# Patient Record
Sex: FEMALE | Race: WHITE
[De-identification: names, ages, dates, MRNs, and addresses within clinical notes are randomized per-mention and may not be internally consistent; named-entity substitution may affect disease eponyms.]

---

## 2018-09-04 ENCOUNTER — HOSPITAL ENCOUNTER (OUTPATIENT)
Age: 58
End: 2018-09-04
Payer: MEDICAID

## 2018-09-04 DIAGNOSIS — M25.511: Primary | ICD-10-CM

## 2018-09-04 PROCEDURE — 73030 X-RAY EXAM OF SHOULDER: CPT

## 2020-03-05 ENCOUNTER — HOSPITAL ENCOUNTER (OUTPATIENT)
Age: 60
End: 2020-03-05
Payer: COMMERCIAL

## 2020-03-05 DIAGNOSIS — Z12.2: ICD-10-CM

## 2020-03-05 DIAGNOSIS — Z87.891: ICD-10-CM

## 2020-03-05 DIAGNOSIS — Z12.31: Primary | ICD-10-CM

## 2020-03-05 PROCEDURE — 77063 BREAST TOMOSYNTHESIS BI: CPT

## 2020-03-05 PROCEDURE — 77067 SCR MAMMO BI INCL CAD: CPT

## 2020-07-09 ENCOUNTER — HOSPITAL ENCOUNTER (OUTPATIENT)
Age: 60
End: 2020-07-09
Payer: COMMERCIAL

## 2020-07-09 DIAGNOSIS — Z01.818: Primary | ICD-10-CM

## 2020-07-09 PROCEDURE — 36415 COLL VENOUS BLD VENIPUNCTURE: CPT

## 2020-07-09 PROCEDURE — 86328 IA NFCT AB SARSCOV2 COVID19: CPT

## 2020-07-10 ENCOUNTER — ON CAMPUS - OUTPATIENT (OUTPATIENT)
Dept: RURAL HOSPITAL 6 | Facility: HOSPITAL | Age: 60
End: 2020-07-10
Payer: COMMERCIAL

## 2020-07-10 ENCOUNTER — HOSPITAL ENCOUNTER (OUTPATIENT)
Dept: HOSPITAL 22 - OUTP | Age: 60
Discharge: HOME | End: 2020-07-10
Payer: COMMERCIAL

## 2020-07-10 VITALS
DIASTOLIC BLOOD PRESSURE: 88 MMHG | SYSTOLIC BLOOD PRESSURE: 136 MMHG | OXYGEN SATURATION: 97 % | HEART RATE: 73 BPM | TEMPERATURE: 97.1 F | RESPIRATION RATE: 18 BRPM

## 2020-07-10 VITALS — OXYGEN SATURATION: 97 %

## 2020-07-10 VITALS
DIASTOLIC BLOOD PRESSURE: 76 MMHG | SYSTOLIC BLOOD PRESSURE: 115 MMHG | TEMPERATURE: 97.1 F | RESPIRATION RATE: 16 BRPM | HEART RATE: 70 BPM | OXYGEN SATURATION: 97 %

## 2020-07-10 VITALS
TEMPERATURE: 97.16 F | DIASTOLIC BLOOD PRESSURE: 69 MMHG | HEART RATE: 69 BPM | SYSTOLIC BLOOD PRESSURE: 91 MMHG | RESPIRATION RATE: 16 BRPM | OXYGEN SATURATION: 98 %

## 2020-07-10 VITALS
TEMPERATURE: 97.16 F | OXYGEN SATURATION: 97 % | DIASTOLIC BLOOD PRESSURE: 72 MMHG | SYSTOLIC BLOOD PRESSURE: 105 MMHG | RESPIRATION RATE: 16 BRPM | HEART RATE: 72 BPM

## 2020-07-10 VITALS
SYSTOLIC BLOOD PRESSURE: 99 MMHG | RESPIRATION RATE: 16 BRPM | TEMPERATURE: 97.1 F | OXYGEN SATURATION: 95 % | HEART RATE: 74 BPM | DIASTOLIC BLOOD PRESSURE: 68 MMHG

## 2020-07-10 VITALS
RESPIRATION RATE: 18 BRPM | HEART RATE: 77 BPM | TEMPERATURE: 98.1 F | SYSTOLIC BLOOD PRESSURE: 135 MMHG | DIASTOLIC BLOOD PRESSURE: 72 MMHG | OXYGEN SATURATION: 96 %

## 2020-07-10 VITALS — BODY MASS INDEX: 34.9 KG/M2

## 2020-07-10 DIAGNOSIS — F41.9: ICD-10-CM

## 2020-07-10 DIAGNOSIS — K63.5: ICD-10-CM

## 2020-07-10 DIAGNOSIS — D12.4 BENIGN NEOPLASM OF DESCENDING COLON: ICD-10-CM

## 2020-07-10 DIAGNOSIS — Z90.710: ICD-10-CM

## 2020-07-10 DIAGNOSIS — D12.3 BENIGN NEOPLASM OF TRANSVERSE COLON: ICD-10-CM

## 2020-07-10 DIAGNOSIS — D12.5 BENIGN NEOPLASM OF SIGMOID COLON: ICD-10-CM

## 2020-07-10 DIAGNOSIS — Z79.891: ICD-10-CM

## 2020-07-10 DIAGNOSIS — Z12.11 ENCOUNTER FOR SCREENING FOR MALIGNANT NEOPLASM OF COLON: ICD-10-CM

## 2020-07-10 DIAGNOSIS — K64.0: ICD-10-CM

## 2020-07-10 DIAGNOSIS — Z72.0: ICD-10-CM

## 2020-07-10 DIAGNOSIS — D12.0 BENIGN NEOPLASM OF CECUM: ICD-10-CM

## 2020-07-10 DIAGNOSIS — Z87.11: ICD-10-CM

## 2020-07-10 DIAGNOSIS — E78.5: ICD-10-CM

## 2020-07-10 DIAGNOSIS — Z12.11: Primary | ICD-10-CM

## 2020-07-10 DIAGNOSIS — F32.9: ICD-10-CM

## 2020-07-10 DIAGNOSIS — Z79.899: ICD-10-CM

## 2020-07-10 DIAGNOSIS — I10: ICD-10-CM

## 2020-07-10 PROCEDURE — 45385 COLONOSCOPY W/LESION REMOVAL: CPT | Mod: 33 | Performed by: INTERNAL MEDICINE

## 2020-07-10 PROCEDURE — 45385 COLONOSCOPY W/LESION REMOVAL: CPT

## 2020-07-10 PROCEDURE — 0DJD8ZZ INSPECTION OF LOWER INTESTINAL TRACT, VIA NATURAL OR ARTIFICIAL OPENING ENDOSCOPIC: ICD-10-PCS | Performed by: INTERNAL MEDICINE

## 2021-05-18 ENCOUNTER — HOSPITAL ENCOUNTER (OUTPATIENT)
Age: 61
End: 2021-05-18
Payer: COMMERCIAL

## 2021-05-18 DIAGNOSIS — Z20.822: Primary | ICD-10-CM

## 2021-05-18 PROCEDURE — 71045 X-RAY EXAM CHEST 1 VIEW: CPT

## 2021-05-18 PROCEDURE — U0003 INFECTIOUS AGENT DETECTION BY NUCLEIC ACID (DNA OR RNA); SEVERE ACUTE RESPIRATORY SYNDROME CORONAVIRUS 2 (SARS-COV-2) (CORONAVIRUS DISEASE [COVID-19]), AMPLIFIED PROBE TECHNIQUE, MAKING USE OF HIGH THROUGHPUT TECHNOLOGIES AS DESCRIBED BY CMS-2020-01-R: HCPCS

## 2021-09-19 ENCOUNTER — HOSPITAL ENCOUNTER (EMERGENCY)
Age: 61
Discharge: HOME | End: 2021-09-19
Payer: COMMERCIAL

## 2021-09-19 VITALS
SYSTOLIC BLOOD PRESSURE: 125 MMHG | OXYGEN SATURATION: 98 % | HEART RATE: 91 BPM | TEMPERATURE: 98.6 F | DIASTOLIC BLOOD PRESSURE: 84 MMHG | RESPIRATION RATE: 19 BRPM

## 2021-09-19 VITALS
OXYGEN SATURATION: 98 % | HEART RATE: 91 BPM | RESPIRATION RATE: 19 BRPM | TEMPERATURE: 98.6 F | SYSTOLIC BLOOD PRESSURE: 125 MMHG | DIASTOLIC BLOOD PRESSURE: 84 MMHG

## 2021-09-19 VITALS — BODY MASS INDEX: 34 KG/M2

## 2021-09-19 DIAGNOSIS — Z20.822: Primary | ICD-10-CM

## 2021-09-19 DIAGNOSIS — E78.5: ICD-10-CM

## 2021-09-19 DIAGNOSIS — J02.9: ICD-10-CM

## 2021-09-19 DIAGNOSIS — I10: ICD-10-CM

## 2021-09-19 DIAGNOSIS — Z79.899: ICD-10-CM

## 2021-09-19 PROCEDURE — U0003 INFECTIOUS AGENT DETECTION BY NUCLEIC ACID (DNA OR RNA); SEVERE ACUTE RESPIRATORY SYNDROME CORONAVIRUS 2 (SARS-COV-2) (CORONAVIRUS DISEASE [COVID-19]), AMPLIFIED PROBE TECHNIQUE, MAKING USE OF HIGH THROUGHPUT TECHNOLOGIES AS DESCRIBED BY CMS-2020-01-R: HCPCS

## 2021-09-19 PROCEDURE — 99202 OFFICE O/P NEW SF 15 MIN: CPT

## 2021-09-19 PROCEDURE — C9803 HOPD COVID-19 SPEC COLLECT: HCPCS

## 2021-09-19 PROCEDURE — U0005 INFEC AGEN DETEC AMPLI PROBE: HCPCS

## 2021-09-19 PROCEDURE — G0463 HOSPITAL OUTPT CLINIC VISIT: HCPCS

## 2022-05-05 ENCOUNTER — OFFICE VISIT (OUTPATIENT)
Dept: FAMILY MEDICINE CLINIC | Facility: CLINIC | Age: 62
End: 2022-05-05

## 2022-05-05 VITALS
SYSTOLIC BLOOD PRESSURE: 118 MMHG | HEART RATE: 62 BPM | OXYGEN SATURATION: 99 % | DIASTOLIC BLOOD PRESSURE: 70 MMHG | WEIGHT: 212.6 LBS | RESPIRATION RATE: 20 BRPM | BODY MASS INDEX: 40.14 KG/M2 | TEMPERATURE: 97.3 F | HEIGHT: 61 IN

## 2022-05-05 DIAGNOSIS — N18.31 STAGE 3A CHRONIC KIDNEY DISEASE (CKD): ICD-10-CM

## 2022-05-05 DIAGNOSIS — M47.816 ARTHRITIS OF LUMBAR SPINE: Chronic | ICD-10-CM

## 2022-05-05 DIAGNOSIS — E78.00 HYPERCHOLESTEROLEMIA: ICD-10-CM

## 2022-05-05 DIAGNOSIS — M48.02 FORAMINAL STENOSIS OF CERVICAL REGION: Chronic | ICD-10-CM

## 2022-05-05 DIAGNOSIS — I10 ESSENTIAL HYPERTENSION: Primary | ICD-10-CM

## 2022-05-05 PROBLEM — F41.1 GENERALIZED ANXIETY DISORDER: Status: ACTIVE | Noted: 2022-05-05

## 2022-05-05 PROBLEM — G89.4 CHRONIC PAIN SYNDROME: Status: ACTIVE | Noted: 2022-05-05

## 2022-05-05 LAB
BUN SERPL-MCNC: 9 MG/DL (ref 8–23)
BUN/CREAT SERPL: 7.6 (ref 7–25)
CALCIUM SERPL-MCNC: 10.4 MG/DL (ref 8.6–10.5)
CHLORIDE SERPL-SCNC: 104 MMOL/L (ref 98–107)
CHOLEST SERPL-MCNC: 176 MG/DL (ref 0–200)
CO2 SERPL-SCNC: 30.4 MMOL/L (ref 22–29)
CREAT SERPL-MCNC: 1.18 MG/DL (ref 0.57–1)
EGFRCR SERPLBLD CKD-EPI 2021: 52.7 ML/MIN/1.73
GLUCOSE SERPL-MCNC: 97 MG/DL (ref 65–99)
HDLC SERPL-MCNC: 38 MG/DL (ref 40–60)
LDLC SERPL CALC-MCNC: 96 MG/DL (ref 0–100)
POTASSIUM SERPL-SCNC: 4.4 MMOL/L (ref 3.5–5.2)
SODIUM SERPL-SCNC: 144 MMOL/L (ref 136–145)
TRIGL SERPL-MCNC: 247 MG/DL (ref 0–150)
VLDLC SERPL CALC-MCNC: 42 MG/DL (ref 5–40)

## 2022-05-05 PROCEDURE — 99214 OFFICE O/P EST MOD 30 MIN: CPT | Performed by: FAMILY MEDICINE

## 2022-05-05 RX ORDER — HYDROCHLOROTHIAZIDE 12.5 MG/1
12.5 CAPSULE, GELATIN COATED ORAL EVERY MORNING
COMMUNITY
Start: 2022-04-18 | End: 2022-08-05 | Stop reason: SDUPTHER

## 2022-05-05 RX ORDER — HYDROCODONE BITARTRATE AND ACETAMINOPHEN 7.5; 325 MG/1; MG/1
1 TABLET ORAL EVERY 8 HOURS PRN
Qty: 90 TABLET | Refills: 0 | Status: SHIPPED | OUTPATIENT
Start: 2022-05-05 | End: 2022-06-06 | Stop reason: SDUPTHER

## 2022-05-05 RX ORDER — BISOPROLOL FUMARATE AND HYDROCHLOROTHIAZIDE 10; 6.25 MG/1; MG/1
1 TABLET ORAL DAILY
COMMUNITY
Start: 2022-04-15 | End: 2022-08-05 | Stop reason: SDUPTHER

## 2022-05-05 RX ORDER — FAMOTIDINE 20 MG/1
20 TABLET, FILM COATED ORAL NIGHTLY PRN
COMMUNITY
Start: 2022-04-27 | End: 2023-02-09 | Stop reason: SDUPTHER

## 2022-05-05 RX ORDER — DAPAGLIFLOZIN 10 MG/1
1 TABLET, FILM COATED ORAL DAILY
COMMUNITY
Start: 2022-04-27 | End: 2022-08-05 | Stop reason: SDUPTHER

## 2022-05-05 RX ORDER — CYCLOBENZAPRINE HCL 5 MG
TABLET ORAL
COMMUNITY
Start: 2022-03-31 | End: 2023-02-09 | Stop reason: SDUPTHER

## 2022-05-05 RX ORDER — LOVASTATIN 10 MG/1
TABLET ORAL
COMMUNITY
Start: 2022-04-18 | End: 2022-08-05 | Stop reason: SDUPTHER

## 2022-05-05 RX ORDER — VENLAFAXINE HYDROCHLORIDE 75 MG/1
75 CAPSULE, EXTENDED RELEASE ORAL DAILY
COMMUNITY
Start: 2022-03-31 | End: 2023-02-09 | Stop reason: SDUPTHER

## 2022-05-05 RX ORDER — DIAZEPAM 10 MG/1
10 TABLET ORAL 3 TIMES DAILY PRN
COMMUNITY
Start: 2022-04-30 | End: 2022-09-27 | Stop reason: SDUPTHER

## 2022-05-05 RX ORDER — HYDROCODONE BITARTRATE AND ACETAMINOPHEN 7.5; 325 MG/1; MG/1
1 TABLET ORAL EVERY 8 HOURS PRN
COMMUNITY
End: 2022-05-05 | Stop reason: SDUPTHER

## 2022-05-05 NOTE — PROGRESS NOTES
"Chief Complaint   Patient presents with   • Establish Care     Previous Liseth pt    • Hypertension   • Chronic Kidney Disease       Subjective      Sherri Elkins is a 62 y.o. who presents for visit for HTN and CKD. At her last visit Farxiga was added for CKD. She has additional complaint of burning in the left lower neck with prolonged head flexion as well as new left sided low back pain that radiates to the hip. She has known Lumbar arthritis.    Objective   Vital Signs:  /70   Pulse 62   Temp 97.3 °F (36.3 °C)   Resp 20   Ht 154.9 cm (61\")   Wt 96.4 kg (212 lb 9.6 oz)   SpO2 99%   BMI 40.17 kg/m²     Class 3 Severe Obesity (BMI >=40). Obesity-related health conditions include the following: hypertension, dyslipidemias, osteoarthritis and CKD. Obesity is improving with lifestyle modifications. BMI is is above average; BMI management plan is completed. We discussed low calorie, low carb based diet program and portion control.        Physical Exam  Constitutional:       Appearance: She is obese.   Neck:      Comments: Decreased ROM to right  Cardiovascular:      Rate and Rhythm: Normal rate and regular rhythm.   Pulmonary:      Effort: Pulmonary effort is normal.      Breath sounds: Normal breath sounds.   Musculoskeletal:      Cervical back: Rigidity present.   Neurological:      Mental Status: She is alert.          Result Review                     Assessment and Plan  Diagnoses and all orders for this visit:    1. Essential hypertension (Primary)  Assessment & Plan:  Hypertension is improving with treatment.  Continue current treatment regimen.  Dietary sodium restriction.  Continue current medications.  Blood pressure will be reassessed at the next regular appointment.    Orders:  -     Basic Metabolic Panel  -     Lipid Panel    2. Hypercholesterolemia  Assessment & Plan:  Lipid abnormalities are improving with treatment.  Pharmacotherapy as ordered.  Lipids will be reassessed in 1 " year.    Orders:  -     Lipid Panel    3. Stage 3a chronic kidney disease (CKD) ([facility])  Assessment & Plan:  Renal condition is newly identified.  Continue current treatment regimen.  Continue current medications.  repeat BMP  Renal condition will be reassessed in 6 months.    Orders:  -     Basic Metabolic Panel    4. Arthritis of lumbar spine  Comments:  1. Repeat xrays  2. Refill Norco  3. Caleb Reviewed  Orders:  -     XR Spine Lumbar Complete With Flex & Ext; Future  -     HYDROcodone-acetaminophen (NORCO) 7.5-325 MG per tablet; Take 1 tablet by mouth Every 8 (Eight) Hours As Needed for Moderate Pain .  Dispense: 90 tablet; Refill: 0    5. Foraminal stenosis of cervical region  Comments:  ROM exercises          Follow Up  Return in about 3 months (around 8/5/2022) for Next scheduled follow up HTN.  Patient was given instructions and counseling regarding her condition or for health maintenance advice. Please see specific information pulled into the AVS if appropriate.

## 2022-05-05 NOTE — ASSESSMENT & PLAN NOTE
Renal condition is newly identified.  Continue current treatment regimen.  Continue current medications.  repeat BMP  Renal condition will be reassessed in 6 months.

## 2022-06-06 ENCOUNTER — PATIENT MESSAGE (OUTPATIENT)
Dept: FAMILY MEDICINE CLINIC | Facility: CLINIC | Age: 62
End: 2022-06-06

## 2022-06-06 DIAGNOSIS — M47.816 ARTHRITIS OF LUMBAR SPINE: Chronic | ICD-10-CM

## 2022-06-06 RX ORDER — HYDROCODONE BITARTRATE AND ACETAMINOPHEN 7.5; 325 MG/1; MG/1
1 TABLET ORAL EVERY 8 HOURS PRN
Qty: 90 TABLET | Refills: 0 | Status: SHIPPED | OUTPATIENT
Start: 2022-06-06 | End: 2022-07-04 | Stop reason: SDUPTHER

## 2022-06-06 NOTE — TELEPHONE ENCOUNTER
From: Sherri Elkins  To: Malik Reddy MD  Sent: 2022 2:25 PM EDT  Subject: refill on my hydroco/apap 7.5-325 rx #[id number]    call to [facility] pharmacy [phone] thank you Sherri Elkins  : 1960 [place], [place]

## 2022-07-04 DIAGNOSIS — M47.816 ARTHRITIS OF LUMBAR SPINE: Chronic | ICD-10-CM

## 2022-07-06 RX ORDER — HYDROCODONE BITARTRATE AND ACETAMINOPHEN 7.5; 325 MG/1; MG/1
1 TABLET ORAL EVERY 8 HOURS PRN
Qty: 90 TABLET | Refills: 0 | Status: SHIPPED | OUTPATIENT
Start: 2022-07-06 | End: 2022-08-05 | Stop reason: SDUPTHER

## 2022-08-05 ENCOUNTER — OFFICE VISIT (OUTPATIENT)
Dept: FAMILY MEDICINE CLINIC | Facility: CLINIC | Age: 62
End: 2022-08-05

## 2022-08-05 VITALS
HEIGHT: 61 IN | WEIGHT: 203.8 LBS | SYSTOLIC BLOOD PRESSURE: 104 MMHG | HEART RATE: 62 BPM | DIASTOLIC BLOOD PRESSURE: 72 MMHG | RESPIRATION RATE: 18 BRPM | TEMPERATURE: 97.1 F | OXYGEN SATURATION: 96 % | BODY MASS INDEX: 38.48 KG/M2

## 2022-08-05 DIAGNOSIS — M47.816 ARTHRITIS OF LUMBAR SPINE: Chronic | ICD-10-CM

## 2022-08-05 DIAGNOSIS — N18.31 STAGE 3A CHRONIC KIDNEY DISEASE (CKD): ICD-10-CM

## 2022-08-05 DIAGNOSIS — E78.00 HYPERCHOLESTEROLEMIA: ICD-10-CM

## 2022-08-05 DIAGNOSIS — E66.01 CLASS 2 SEVERE OBESITY DUE TO EXCESS CALORIES WITH SERIOUS COMORBIDITY AND BODY MASS INDEX (BMI) OF 38.0 TO 38.9 IN ADULT: ICD-10-CM

## 2022-08-05 DIAGNOSIS — I10 ESSENTIAL HYPERTENSION: Primary | ICD-10-CM

## 2022-08-05 DIAGNOSIS — Z12.31 SCREENING MAMMOGRAM FOR BREAST CANCER: ICD-10-CM

## 2022-08-05 DIAGNOSIS — Z71.85 VACCINE COUNSELING: ICD-10-CM

## 2022-08-05 DIAGNOSIS — M48.02 FORAMINAL STENOSIS OF CERVICAL REGION: ICD-10-CM

## 2022-08-05 DIAGNOSIS — G89.4 CHRONIC PAIN SYNDROME: ICD-10-CM

## 2022-08-05 PROCEDURE — 99214 OFFICE O/P EST MOD 30 MIN: CPT | Performed by: FAMILY MEDICINE

## 2022-08-05 RX ORDER — HYDROCODONE BITARTRATE AND ACETAMINOPHEN 7.5; 325 MG/1; MG/1
1 TABLET ORAL EVERY 8 HOURS PRN
Qty: 90 TABLET | Refills: 0 | Status: SHIPPED | OUTPATIENT
Start: 2022-08-05 | End: 2022-08-05

## 2022-08-05 RX ORDER — BISOPROLOL FUMARATE AND HYDROCHLOROTHIAZIDE 10; 6.25 MG/1; MG/1
1 TABLET ORAL DAILY
Qty: 30 TABLET | Refills: 6 | Status: SHIPPED | OUTPATIENT
Start: 2022-08-05 | End: 2023-02-09 | Stop reason: SDUPTHER

## 2022-08-05 RX ORDER — LOVASTATIN 10 MG/1
10 TABLET ORAL NIGHTLY
Qty: 90 TABLET | Refills: 1 | Status: SHIPPED | OUTPATIENT
Start: 2022-08-05 | End: 2023-02-09 | Stop reason: SDUPTHER

## 2022-08-05 RX ORDER — DAPAGLIFLOZIN 10 MG/1
1 TABLET, FILM COATED ORAL DAILY
Qty: 30 TABLET | Refills: 6 | Status: SHIPPED | OUTPATIENT
Start: 2022-08-05 | End: 2023-02-09 | Stop reason: SDUPTHER

## 2022-08-05 RX ORDER — HYDROCHLOROTHIAZIDE 12.5 MG/1
12.5 CAPSULE, GELATIN COATED ORAL EVERY MORNING
Qty: 30 CAPSULE | Refills: 6 | Status: SHIPPED | OUTPATIENT
Start: 2022-08-05 | End: 2023-02-09 | Stop reason: SDUPTHER

## 2022-08-05 RX ORDER — HYDROCODONE BITARTRATE AND ACETAMINOPHEN 7.5; 325 MG/1; MG/1
1 TABLET ORAL EVERY 8 HOURS PRN
Qty: 90 TABLET | Refills: 0 | Status: SHIPPED | OUTPATIENT
Start: 2022-08-05 | End: 2022-09-02 | Stop reason: SDUPTHER

## 2022-08-05 RX ORDER — HYDROCODONE BITARTRATE AND ACETAMINOPHEN 7.5; 325 MG/1; MG/1
1 TABLET ORAL EVERY 8 HOURS PRN
Qty: 90 TABLET | Refills: 0 | Status: CANCELLED | OUTPATIENT
Start: 2022-08-05

## 2022-08-05 NOTE — PROGRESS NOTES
"Chief Complaint   Patient presents with   • Hypertension   • kidney check up     Stage 3       Subjective      Sherri Elkins is a 62 y.o. who presents for maintenance visit for hypertension, stage IIIa chronic kidney disease.  Patient also needs a refill on her Norco which she takes for arthritis of the cervical and lumbar spine.  Since last visit patient has lost 9 pounds.  She attributes an increase activity level watching grandchildren as the cause.  She is not checking blood pressure at home.  She has noticed less pain and believes weight loss may be contributing.    Review of Systems    Objective   Vital Signs:  /72   Pulse 62   Temp 97.1 °F (36.2 °C)   Resp 18   Ht 154.9 cm (60.98\")   Wt 92.4 kg (203 lb 12.8 oz)   SpO2 96%   BMI 38.53 kg/m²             Physical Exam  Constitutional:       Appearance: Normal appearance.   Cardiovascular:      Rate and Rhythm: Normal rate and regular rhythm.      Pulses: Normal pulses.      Heart sounds: Normal heart sounds.   Pulmonary:      Effort: Pulmonary effort is normal.      Breath sounds: Normal breath sounds.   Skin:     General: Skin is warm.      Comments: Brown macules less than 1 cm on the lower extremities   Neurological:      Mental Status: She is alert.          Result Review                     Assessment and Plan  Diagnoses and all orders for this visit:    1. Essential hypertension (Primary)  Comments:  Well-controlled.  Refilled medications  Orders:  -     bisoprolol-hydrochlorothiazide (ZIAC) 10-6.25 MG per tablet; Take 1 tablet by mouth Daily.  Dispense: 30 tablet; Refill: 6  -     hydroCHLOROthiazide (MICROZIDE) 12.5 MG capsule; Take 1 capsule by mouth Every Morning.  Dispense: 30 capsule; Refill: 6  -     Basic Metabolic Panel    2. Hypercholesterolemia  Comments:  Refill statin  Orders:  -     lovastatin (MEVACOR) 10 MG tablet; Take 1 tablet by mouth Every Night.  Dispense: 90 tablet; Refill: 1    3. Arthritis of lumbar " spine  Comments:  Refill Norco.  Caleb reviewed  Orders:  -     HYDROcodone-acetaminophen (NORCO) 7.5-325 MG per tablet; Take 1 tablet by mouth Every 8 (Eight) Hours As Needed for Moderate Pain .  Dispense: 90 tablet; Refill: 0    4. Stage 3a chronic kidney disease (CKD) ([facility])  -     Farxiga 10 MG tablet; Take 10 mg by mouth Daily.  Dispense: 30 tablet; Refill: 6  -     Basic Metabolic Panel    5. Foraminal stenosis of cervical region  -     HYDROcodone-acetaminophen (NORCO) 7.5-325 MG per tablet; Take 1 tablet by mouth Every 8 (Eight) Hours As Needed for Moderate Pain .  Dispense: 90 tablet; Refill: 0    6. Body mass index (BMI) of 38.0 to 38.9 in adult    7. Chronic pain syndrome  Comments:  Seems improved with weight loss  Orders:  -     HYDROcodone-acetaminophen (NORCO) 7.5-325 MG per tablet; Take 1 tablet by mouth Every 8 (Eight) Hours As Needed for Moderate Pain .  Dispense: 90 tablet; Refill: 0    8. Screening mammogram for breast cancer  Comments:  Arrange mammogram  Orders:  -     Mammo Screening Digital Tomosynthesis Bilateral With CAD; Future    9. Vaccine counseling  Comments:  Recommended completing her Moderna COVID-vaccine series    10. Class 2 severe obesity due to excess calories with serious comorbidity and body mass index (BMI) of 38.0 to 38.9 in adult ([facility])  Comments:  Congratulated patient on weight loss    Encouraged completion of COVID-vaccine series.  Mammogram will be arranged.  CRC screening is up-to-date and we will contact Dr. Lazcano office to obtain results        Follow Up  Return in about 6 weeks (around 9/16/2022) for Annual.  Patient was given instructions and counseling regarding her condition or for health maintenance advice. Please see specific information pulled into the AVS if appropriate.

## 2022-08-06 LAB
BUN SERPL-MCNC: 10 MG/DL (ref 8–27)
BUN/CREAT SERPL: 9 (ref 12–28)
CALCIUM SERPL-MCNC: 9.7 MG/DL (ref 8.7–10.3)
CHLORIDE SERPL-SCNC: 102 MMOL/L (ref 96–106)
CO2 SERPL-SCNC: 26 MMOL/L (ref 20–29)
CREAT SERPL-MCNC: 1.08 MG/DL (ref 0.57–1)
EGFRCR SERPLBLD CKD-EPI 2021: 58 ML/MIN/1.73
GLUCOSE SERPL-MCNC: 98 MG/DL (ref 65–99)
POTASSIUM SERPL-SCNC: 4 MMOL/L (ref 3.5–5.2)
SODIUM SERPL-SCNC: 141 MMOL/L (ref 134–144)

## 2022-09-02 DIAGNOSIS — G89.4 CHRONIC PAIN SYNDROME: ICD-10-CM

## 2022-09-02 DIAGNOSIS — M48.02 FORAMINAL STENOSIS OF CERVICAL REGION: ICD-10-CM

## 2022-09-02 DIAGNOSIS — M47.816 ARTHRITIS OF LUMBAR SPINE: Chronic | ICD-10-CM

## 2022-09-02 RX ORDER — HYDROCODONE BITARTRATE AND ACETAMINOPHEN 7.5; 325 MG/1; MG/1
1 TABLET ORAL EVERY 8 HOURS PRN
Qty: 90 TABLET | Refills: 0 | Status: SHIPPED | OUTPATIENT
Start: 2022-09-02 | End: 2022-10-02 | Stop reason: SDUPTHER

## 2022-09-02 NOTE — TELEPHONE ENCOUNTER
Rx Refill Note  Requested Prescriptions     Pending Prescriptions Disp Refills   • HYDROcodone-acetaminophen (NORCO) 7.5-325 MG per tablet 90 tablet 0     Sig: Take 1 tablet by mouth Every 8 (Eight) Hours As Needed for Moderate Pain.      Last office visit with prescribing clinician: 8/5/2022      Next office visit with prescribing clinician: 9/2/2022            Stephanie Hobson MA  09/02/22, 15:00 EDT

## 2022-09-13 ENCOUNTER — HOSPITAL ENCOUNTER (OUTPATIENT)
Age: 62
End: 2022-09-13
Payer: COMMERCIAL

## 2022-09-13 DIAGNOSIS — Z12.31: Primary | ICD-10-CM

## 2022-09-13 PROCEDURE — 77063 BREAST TOMOSYNTHESIS BI: CPT

## 2022-09-13 PROCEDURE — 77067 SCR MAMMO BI INCL CAD: CPT

## 2022-09-27 ENCOUNTER — PATIENT MESSAGE (OUTPATIENT)
Dept: FAMILY MEDICINE CLINIC | Facility: CLINIC | Age: 62
End: 2022-09-27

## 2022-09-27 DIAGNOSIS — F41.1 GENERALIZED ANXIETY DISORDER: Primary | ICD-10-CM

## 2022-09-27 RX ORDER — DIAZEPAM 10 MG/1
10 TABLET ORAL 3 TIMES DAILY PRN
Qty: 90 TABLET | Refills: 5 | Status: SHIPPED | OUTPATIENT
Start: 2022-09-27

## 2022-10-02 DIAGNOSIS — M48.02 FORAMINAL STENOSIS OF CERVICAL REGION: ICD-10-CM

## 2022-10-02 DIAGNOSIS — G89.4 CHRONIC PAIN SYNDROME: ICD-10-CM

## 2022-10-02 DIAGNOSIS — M47.816 ARTHRITIS OF LUMBAR SPINE: Chronic | ICD-10-CM

## 2022-10-03 RX ORDER — HYDROCODONE BITARTRATE AND ACETAMINOPHEN 7.5; 325 MG/1; MG/1
1 TABLET ORAL EVERY 8 HOURS PRN
Qty: 90 TABLET | Refills: 0 | Status: SHIPPED | OUTPATIENT
Start: 2022-10-03 | End: 2022-11-02 | Stop reason: SDUPTHER

## 2022-10-03 NOTE — TELEPHONE ENCOUNTER
Rx Refill Note  Requested Prescriptions     Pending Prescriptions Disp Refills   • HYDROcodone-acetaminophen (NORCO) 7.5-325 MG per tablet 90 tablet 0     Sig: Take 1 tablet by mouth Every 8 (Eight) Hours As Needed for Moderate Pain.      Last office visit with prescribing clinician: 8/5/2022      Next office visit with prescribing clinician: 2/9/2023            Stephanie Hobson MA  10/03/22, 10:23 EDT

## 2022-11-02 DIAGNOSIS — G89.4 CHRONIC PAIN SYNDROME: ICD-10-CM

## 2022-11-02 DIAGNOSIS — M47.816 ARTHRITIS OF LUMBAR SPINE: Chronic | ICD-10-CM

## 2022-11-02 DIAGNOSIS — M48.02 FORAMINAL STENOSIS OF CERVICAL REGION: ICD-10-CM

## 2022-11-03 RX ORDER — HYDROCODONE BITARTRATE AND ACETAMINOPHEN 7.5; 325 MG/1; MG/1
1 TABLET ORAL EVERY 8 HOURS PRN
Qty: 90 TABLET | Refills: 0 | Status: SHIPPED | OUTPATIENT
Start: 2022-11-03 | End: 2022-12-01 | Stop reason: SDUPTHER

## 2022-11-03 NOTE — TELEPHONE ENCOUNTER
Rx Refill Note  Requested Prescriptions     Pending Prescriptions Disp Refills   • HYDROcodone-acetaminophen (NORCO) 7.5-325 MG per tablet 90 tablet 0     Sig: Take 1 tablet by mouth Every 8 (Eight) Hours As Needed for Moderate Pain.      Last office visit with prescribing clinician: 8/5/2022      Next office visit with prescribing clinician: 2/9/2023            Stephanie Hobson MA  11/03/22, 08:54 EDT

## 2022-12-01 DIAGNOSIS — M48.02 FORAMINAL STENOSIS OF CERVICAL REGION: ICD-10-CM

## 2022-12-01 DIAGNOSIS — M47.816 ARTHRITIS OF LUMBAR SPINE: Chronic | ICD-10-CM

## 2022-12-01 DIAGNOSIS — G89.4 CHRONIC PAIN SYNDROME: ICD-10-CM

## 2022-12-01 RX ORDER — HYDROCODONE BITARTRATE AND ACETAMINOPHEN 7.5; 325 MG/1; MG/1
1 TABLET ORAL EVERY 8 HOURS PRN
Qty: 90 TABLET | Refills: 0 | Status: SHIPPED | OUTPATIENT
Start: 2022-12-01 | End: 2023-01-03 | Stop reason: SDUPTHER

## 2023-01-03 DIAGNOSIS — M47.816 ARTHRITIS OF LUMBAR SPINE: Chronic | ICD-10-CM

## 2023-01-03 DIAGNOSIS — G89.4 CHRONIC PAIN SYNDROME: ICD-10-CM

## 2023-01-03 DIAGNOSIS — M48.02 FORAMINAL STENOSIS OF CERVICAL REGION: ICD-10-CM

## 2023-01-04 RX ORDER — HYDROCODONE BITARTRATE AND ACETAMINOPHEN 7.5; 325 MG/1; MG/1
1 TABLET ORAL EVERY 8 HOURS PRN
Qty: 90 TABLET | Refills: 0 | Status: SHIPPED | OUTPATIENT
Start: 2023-01-04 | End: 2023-02-02 | Stop reason: SDUPTHER

## 2023-01-11 ENCOUNTER — TELEPHONE (OUTPATIENT)
Dept: FAMILY MEDICINE CLINIC | Facility: CLINIC | Age: 63
End: 2023-01-11
Payer: COMMERCIAL

## 2023-02-02 DIAGNOSIS — M47.816 ARTHRITIS OF LUMBAR SPINE: Chronic | ICD-10-CM

## 2023-02-02 DIAGNOSIS — M48.02 FORAMINAL STENOSIS OF CERVICAL REGION: ICD-10-CM

## 2023-02-02 DIAGNOSIS — G89.4 CHRONIC PAIN SYNDROME: ICD-10-CM

## 2023-02-02 RX ORDER — HYDROCODONE BITARTRATE AND ACETAMINOPHEN 7.5; 325 MG/1; MG/1
1 TABLET ORAL EVERY 8 HOURS PRN
Qty: 90 TABLET | Refills: 0 | Status: SHIPPED | OUTPATIENT
Start: 2023-02-02 | End: 2023-03-06 | Stop reason: SDUPTHER

## 2023-02-09 ENCOUNTER — OFFICE VISIT (OUTPATIENT)
Dept: FAMILY MEDICINE CLINIC | Facility: CLINIC | Age: 63
End: 2023-02-09
Payer: COMMERCIAL

## 2023-02-09 VITALS
HEART RATE: 64 BPM | HEIGHT: 61 IN | RESPIRATION RATE: 20 BRPM | WEIGHT: 202.6 LBS | DIASTOLIC BLOOD PRESSURE: 68 MMHG | BODY MASS INDEX: 38.25 KG/M2 | OXYGEN SATURATION: 99 % | TEMPERATURE: 97.3 F | SYSTOLIC BLOOD PRESSURE: 120 MMHG

## 2023-02-09 DIAGNOSIS — E66.01 CLASS 2 SEVERE OBESITY DUE TO EXCESS CALORIES WITH SERIOUS COMORBIDITY AND BODY MASS INDEX (BMI) OF 38.0 TO 38.9 IN ADULT: ICD-10-CM

## 2023-02-09 DIAGNOSIS — F41.1 GENERALIZED ANXIETY DISORDER: ICD-10-CM

## 2023-02-09 DIAGNOSIS — K21.9 GASTROESOPHAGEAL REFLUX DISEASE WITHOUT ESOPHAGITIS: ICD-10-CM

## 2023-02-09 DIAGNOSIS — E78.00 HYPERCHOLESTEROLEMIA: ICD-10-CM

## 2023-02-09 DIAGNOSIS — N18.31 STAGE 3A CHRONIC KIDNEY DISEASE (CKD): ICD-10-CM

## 2023-02-09 DIAGNOSIS — I10 ESSENTIAL HYPERTENSION: Primary | ICD-10-CM

## 2023-02-09 DIAGNOSIS — M47.816 ARTHRITIS OF LUMBAR SPINE: Chronic | ICD-10-CM

## 2023-02-09 DIAGNOSIS — Z79.891 LONG TERM CURRENT USE OF OPIATE ANALGESIC: ICD-10-CM

## 2023-02-09 PROBLEM — E66.812 CLASS 2 SEVERE OBESITY DUE TO EXCESS CALORIES WITH SERIOUS COMORBIDITY AND BODY MASS INDEX (BMI) OF 38.0 TO 38.9 IN ADULT: Status: ACTIVE | Noted: 2023-02-09

## 2023-02-09 LAB
BUN SERPL-MCNC: 12 MG/DL (ref 8–23)
BUN/CREAT SERPL: 10.7 (ref 7–25)
CALCIUM SERPL-MCNC: 9.8 MG/DL (ref 8.6–10.5)
CHLORIDE SERPL-SCNC: 103 MMOL/L (ref 98–107)
CO2 SERPL-SCNC: 30.6 MMOL/L (ref 22–29)
CREAT SERPL-MCNC: 1.12 MG/DL (ref 0.57–1)
EGFRCR SERPLBLD CKD-EPI 2021: 56.1 ML/MIN/1.73
GLUCOSE SERPL-MCNC: 89 MG/DL (ref 65–99)
POTASSIUM SERPL-SCNC: 3.9 MMOL/L (ref 3.5–5.2)
SODIUM SERPL-SCNC: 144 MMOL/L (ref 136–145)

## 2023-02-09 PROCEDURE — 99214 OFFICE O/P EST MOD 30 MIN: CPT | Performed by: FAMILY MEDICINE

## 2023-02-09 RX ORDER — DAPAGLIFLOZIN 10 MG/1
1 TABLET, FILM COATED ORAL DAILY
Qty: 30 TABLET | Refills: 6 | Status: SHIPPED | OUTPATIENT
Start: 2023-02-09

## 2023-02-09 RX ORDER — LOVASTATIN 10 MG/1
10 TABLET ORAL NIGHTLY
Qty: 90 TABLET | Refills: 1 | Status: SHIPPED | OUTPATIENT
Start: 2023-02-09

## 2023-02-09 RX ORDER — FAMOTIDINE 20 MG/1
20 TABLET, FILM COATED ORAL NIGHTLY PRN
Qty: 90 TABLET | Refills: 1 | Status: SHIPPED | OUTPATIENT
Start: 2023-02-09

## 2023-02-09 RX ORDER — HYDROCHLOROTHIAZIDE 12.5 MG/1
12.5 CAPSULE, GELATIN COATED ORAL EVERY MORNING
Qty: 90 CAPSULE | Refills: 1 | Status: SHIPPED | OUTPATIENT
Start: 2023-02-09

## 2023-02-09 RX ORDER — VENLAFAXINE HYDROCHLORIDE 75 MG/1
75 CAPSULE, EXTENDED RELEASE ORAL DAILY
Qty: 90 CAPSULE | Refills: 1 | Status: SHIPPED | OUTPATIENT
Start: 2023-02-09

## 2023-02-09 RX ORDER — BISOPROLOL FUMARATE AND HYDROCHLOROTHIAZIDE 10; 6.25 MG/1; MG/1
1 TABLET ORAL DAILY
Qty: 30 TABLET | Refills: 5 | Status: SHIPPED | OUTPATIENT
Start: 2023-02-09

## 2023-02-09 RX ORDER — CYCLOBENZAPRINE HCL 5 MG
5 TABLET ORAL 3 TIMES DAILY PRN
Qty: 90 TABLET | Refills: 5 | Status: SHIPPED | OUTPATIENT
Start: 2023-02-09 | End: 2023-03-22 | Stop reason: SDUPTHER

## 2023-02-09 NOTE — ASSESSMENT & PLAN NOTE
Patient's (Body mass index is 38.3 kg/m².) indicates that they are morbidly/severely obese (BMI > 40 or > 35 with obesity - related health condition) with health conditions that include hypertension, GERD and osteoarthritis . Weight is improving with treatment. BMI  is above average; BMI management plan is completed. We discussed portion control.

## 2023-02-09 NOTE — PROGRESS NOTES
"Chief Complaint   Patient presents with   • Follow-up   • Hypertension   • Hyperlipidemia   • Chronic Kidney Disease   • chronic pain syndrome        Subjective      Sherri Elkins is a 62 y.o. who presents for chronic care visit with conditions addressed as follows:    Hypertension.  Patient reports home monitoring shows blood pressures in the 120s over 70s.  She denies side effects from medications.    Hypercholesterolemia.  She takes her statin daily.    Chronic kidney disease stage IIIa.  Patient remains on Farxiga.  Weight has gone down 1 additional pound.    Mental health.  Patient remains on Effexor XR 75 mg daily and uses Valium 3 times daily as needed.  She reports increased anxiety recently caused by her youngest daughter's ongoing drug dependency problems.  Her daughter recently completed 30 days of rehab and then relapsed.  About 48 hours ago patient was contacted by [place] police as her daughter was found at a bus station.  Her daughter has remained confused  Since returning home.  Patient admits she feels like every time the phone rings she is going to get bad news about her daughter.    Chronic spinal pain.  Patient remains on Norco 7.5 mg every 8 hours.  She reports there are days when the medicine is effective and there are days of the medicine seems ineffective.  She avoids other NSAIDs due to ongoing GERD for which she takes Pepcid.  On good days pain medication will allow her to accomplish ADLs and caring for her grandchildren.        The following portions of the patient's history were reviewed and updated as appropriate: allergies, current medications, past family history, past medical history, past social history, past surgical history and problem list.    Review of Systems    Objective   Vital Signs:  /68   Pulse 64   Temp 97.3 °F (36.3 °C)   Resp 20   Ht 154.9 cm (60.98\")   Wt 91.9 kg (202 lb 9.6 oz)   SpO2 99%   BMI 38.30 kg/m²     Class 2 Severe Obesity (BMI >=35 and " <=39.9). Obesity-related health conditions include the following: hypertension, GERD and osteoarthritis. Obesity is improving with treatment. BMI is is above average; BMI management plan is completed. We discussed low calorie, low carb based diet program.        Physical Exam  Constitutional:       Appearance: Normal appearance.   Cardiovascular:      Rate and Rhythm: Normal rate.      Pulses: Normal pulses.      Heart sounds: Normal heart sounds.   Pulmonary:      Effort: Pulmonary effort is normal.      Breath sounds: Normal breath sounds.   Musculoskeletal:      Right lower leg: No edema.      Left lower leg: No edema.   Neurological:      Mental Status: She is alert.          Result Review                     Assessment and Plan  Diagnoses and all orders for this visit:    1. Essential hypertension (Primary)  Assessment & Plan:  Hypertension is improving with treatment.  Continue current treatment regimen.  Blood pressure will be reassessed at the next regular appointment.    Orders:  -     bisoprolol-hydrochlorothiazide (ZIAC) 10-6.25 MG per tablet; Take 1 tablet by mouth Daily.  Dispense: 30 tablet; Refill: 5  -     hydroCHLOROthiazide (MICROZIDE) 12.5 MG capsule; Take 1 capsule by mouth Every Morning.  Dispense: 90 capsule; Refill: 1    2. Long term current use of opiate analgesic  -     Compliance Drug Analysis, Ur - Urine, Clean Catch    3. Stage 3a chronic kidney disease (CKD) ([facility])  Assessment & Plan:  Renal condition is Stable.  Continue current treatment regimen.  Continue current medications.  Renal condition will be reassessed in 6 months.  BMP ordered today for surveillance    Orders:  -     Basic Metabolic Panel  -     Farxiga 10 MG tablet; Take 10 mg by mouth Daily.  Dispense: 30 tablet; Refill: 6    4. Hypercholesterolemia  Comments:  Refill statin  Orders:  -     lovastatin (MEVACOR) 10 MG tablet; Take 1 tablet by mouth Every Night.  Dispense: 90 tablet; Refill: 1    5. Arthritis of lumbar  spine  Assessment & Plan:  Pain is stable.  She continues to benefit from use of opiates to control pain.  Urine drug screen is been ordered for compliance.    Orders:  -     cyclobenzaprine (FLEXERIL) 5 MG tablet; Take 1 tablet by mouth 3 (Three) Times a Day As Needed for Muscle Spasms.  Dispense: 90 tablet; Refill: 5    6. Generalized anxiety disorder  Assessment & Plan:  Psychological condition is worsening.  Continue current treatment regimen.  Psychological condition  will be reassessed at the next regular appointment.    Orders:  -     venlafaxine XR (EFFEXOR-XR) 75 MG 24 hr capsule; Take 1 capsule by mouth Daily. with food  Dispense: 90 capsule; Refill: 1    7. Gastroesophageal reflux disease without esophagitis  Comments:  Controlled.  Pepcid refilled  Overview:  Controlled.  Pepcid refilled    Orders:  -     famotidine (PEPCID) 20 MG tablet; Take 1 tablet by mouth At Night As Needed for Heartburn.  Dispense: 90 tablet; Refill: 1    8. Body mass index (BMI) of 38.0 to 38.9 in adult    9. Class 2 severe obesity due to excess calories with serious comorbidity and body mass index (BMI) of 38.0 to 38.9 in adult ([facility])  Assessment & Plan:  Patient's (Body mass index is 38.3 kg/m².) indicates that they are morbidly/severely obese (BMI > 40 or > 35 with obesity - related health condition) with health conditions that include hypertension, GERD and osteoarthritis . Weight is improving with treatment. BMI  is above average; BMI management plan is completed. We discussed portion control.             Follow Up  Return in about 6 months (around 8/9/2023) for Annual.  Patient was given instructions and counseling regarding her condition or for health maintenance advice. Please see specific information pulled into the AVS if appropriate.

## 2023-02-09 NOTE — ASSESSMENT & PLAN NOTE
Renal condition is Stable.  Continue current treatment regimen.  Continue current medications.  Renal condition will be reassessed in 6 months.  BMP ordered today for surveillance

## 2023-02-09 NOTE — ASSESSMENT & PLAN NOTE
Pain is stable.  She continues to benefit from use of opiates to control pain.  Urine drug screen is been ordered for compliance.

## 2023-02-16 LAB — DRUGS UR: NORMAL

## 2023-03-06 DIAGNOSIS — M48.02 FORAMINAL STENOSIS OF CERVICAL REGION: ICD-10-CM

## 2023-03-06 DIAGNOSIS — M47.816 ARTHRITIS OF LUMBAR SPINE: Chronic | ICD-10-CM

## 2023-03-06 DIAGNOSIS — G89.4 CHRONIC PAIN SYNDROME: ICD-10-CM

## 2023-03-06 RX ORDER — HYDROCODONE BITARTRATE AND ACETAMINOPHEN 7.5; 325 MG/1; MG/1
1 TABLET ORAL EVERY 8 HOURS PRN
Qty: 90 TABLET | Refills: 0 | Status: SHIPPED | OUTPATIENT
Start: 2023-03-06 | End: 2023-04-04 | Stop reason: SDUPTHER

## 2023-03-06 NOTE — TELEPHONE ENCOUNTER
Rx Refill Note  Requested Prescriptions     Pending Prescriptions Disp Refills   • HYDROcodone-acetaminophen (NORCO) 7.5-325 MG per tablet 90 tablet 0     Sig: Take 1 tablet by mouth Every 8 (Eight) Hours As Needed for Moderate Pain.      Last office visit with prescribing clinician: 2/9/2023   Last telemedicine visit with prescribing clinician: 8/10/2023   Next office visit with prescribing clinician: 8/10/2023                         Would you like a call back once the refill request has been completed: [] Yes [] No    If the office needs to give you a call back, can they leave a voicemail: [] Yes [] No    Stephanie Hobson MA  03/06/23, 12:47 EST

## 2023-03-22 DIAGNOSIS — M47.816 ARTHRITIS OF LUMBAR SPINE: Chronic | ICD-10-CM

## 2023-03-22 RX ORDER — CYCLOBENZAPRINE HCL 5 MG
5 TABLET ORAL 3 TIMES DAILY PRN
Qty: 90 TABLET | Refills: 5 | Status: SHIPPED | OUTPATIENT
Start: 2023-03-22

## 2023-04-04 DIAGNOSIS — M48.02 FORAMINAL STENOSIS OF CERVICAL REGION: ICD-10-CM

## 2023-04-04 DIAGNOSIS — G89.4 CHRONIC PAIN SYNDROME: ICD-10-CM

## 2023-04-04 DIAGNOSIS — M47.816 ARTHRITIS OF LUMBAR SPINE: Chronic | ICD-10-CM

## 2023-04-04 RX ORDER — HYDROCODONE BITARTRATE AND ACETAMINOPHEN 7.5; 325 MG/1; MG/1
1 TABLET ORAL EVERY 8 HOURS PRN
Qty: 90 TABLET | Refills: 0 | Status: SHIPPED | OUTPATIENT
Start: 2023-04-04

## 2023-05-01 DIAGNOSIS — F41.1 GENERALIZED ANXIETY DISORDER: ICD-10-CM

## 2023-05-02 RX ORDER — DIAZEPAM 10 MG/1
10 TABLET ORAL 3 TIMES DAILY PRN
Qty: 90 TABLET | Refills: 5 | Status: SHIPPED | OUTPATIENT
Start: 2023-05-02

## 2023-05-03 DIAGNOSIS — G89.4 CHRONIC PAIN SYNDROME: ICD-10-CM

## 2023-05-03 DIAGNOSIS — M48.02 FORAMINAL STENOSIS OF CERVICAL REGION: ICD-10-CM

## 2023-05-03 DIAGNOSIS — M47.816 ARTHRITIS OF LUMBAR SPINE: Chronic | ICD-10-CM

## 2023-05-03 NOTE — TELEPHONE ENCOUNTER
Rx Refill Note  Requested Prescriptions     Pending Prescriptions Disp Refills   • HYDROcodone-acetaminophen (NORCO) 7.5-325 MG per tablet 90 tablet 0     Sig: Take 1 tablet by mouth Every 8 (Eight) Hours As Needed for Moderate Pain.      Last office visit with prescribing clinician: 2/9/2023   Last telemedicine visit with prescribing clinician: 8/10/2023   Next office visit with prescribing clinician: 8/10/2023                         Would you like a call back once the refill request has been completed: [] Yes [] No    If the office needs to give you a call back, can they leave a voicemail: [] Yes [] No    Stephanie Hobson MA  05/03/23, 17:24 EDT

## 2023-05-04 RX ORDER — HYDROCODONE BITARTRATE AND ACETAMINOPHEN 7.5; 325 MG/1; MG/1
1 TABLET ORAL EVERY 8 HOURS PRN
Qty: 90 TABLET | Refills: 0 | Status: SHIPPED | OUTPATIENT
Start: 2023-05-04

## 2023-05-05 ENCOUNTER — TELEPHONE (OUTPATIENT)
Dept: FAMILY MEDICINE CLINIC | Facility: CLINIC | Age: 63
End: 2023-05-05
Payer: COMMERCIAL

## 2023-05-05 NOTE — TELEPHONE ENCOUNTER
This medication or product was previously approved on [id number] from 01/11/2023 to 07/11/2023. You will be able to fill a prescription for this medication at your pharmacy. If your pharmacy has questions regarding the processing of your prescription, please have them call the [employer] pharmacy help desk at [phone].

## 2023-05-05 NOTE — TELEPHONE ENCOUNTER
----- Message from Petra Cali sent at 2/14/2022  2:58 PM CST -----  Regarding: Orders  Type: mammo         Caller is requesting to schedule their mammo appointment prior to annual appointment.    Order is not listed in EPIC.  Please enter order and contact patient to schedule.         Name of Caller:MAYANK DIAL [[id number]]         Preferred Date and Time of Labs:         Date of EPP Appointment:         Where would they like the lab performed?          Would the patient rather a call back or a response via [facility]?          Best Call Back Number: [phone]         Additional Information:     PA submitted via Cover  Meds    Key# [id number]

## 2023-06-02 DIAGNOSIS — G89.4 CHRONIC PAIN SYNDROME: ICD-10-CM

## 2023-06-02 DIAGNOSIS — M48.02 FORAMINAL STENOSIS OF CERVICAL REGION: ICD-10-CM

## 2023-06-02 DIAGNOSIS — M47.816 ARTHRITIS OF LUMBAR SPINE: Chronic | ICD-10-CM

## 2023-06-02 RX ORDER — HYDROCODONE BITARTRATE AND ACETAMINOPHEN 7.5; 325 MG/1; MG/1
1 TABLET ORAL EVERY 8 HOURS PRN
Qty: 90 TABLET | Refills: 0 | Status: SHIPPED | OUTPATIENT
Start: 2023-06-02

## 2023-08-03 DIAGNOSIS — M47.816 ARTHRITIS OF LUMBAR SPINE: Chronic | ICD-10-CM

## 2023-08-03 DIAGNOSIS — G89.4 CHRONIC PAIN SYNDROME: ICD-10-CM

## 2023-08-03 DIAGNOSIS — M48.02 FORAMINAL STENOSIS OF CERVICAL REGION: ICD-10-CM

## 2023-08-03 RX ORDER — HYDROCODONE BITARTRATE AND ACETAMINOPHEN 7.5; 325 MG/1; MG/1
1 TABLET ORAL EVERY 8 HOURS PRN
Qty: 90 TABLET | Refills: 0 | Status: SHIPPED | OUTPATIENT
Start: 2023-08-03

## 2023-08-10 ENCOUNTER — OFFICE VISIT (OUTPATIENT)
Dept: FAMILY MEDICINE CLINIC | Facility: CLINIC | Age: 63
End: 2023-08-10
Payer: COMMERCIAL

## 2023-08-10 VITALS
WEIGHT: 204.4 LBS | SYSTOLIC BLOOD PRESSURE: 120 MMHG | RESPIRATION RATE: 20 BRPM | HEIGHT: 61 IN | HEART RATE: 72 BPM | BODY MASS INDEX: 38.59 KG/M2 | TEMPERATURE: 97.7 F | DIASTOLIC BLOOD PRESSURE: 65 MMHG

## 2023-08-10 DIAGNOSIS — G89.4 CHRONIC PAIN SYNDROME: ICD-10-CM

## 2023-08-10 DIAGNOSIS — L98.9 SKIN LESION OF BACK: ICD-10-CM

## 2023-08-10 DIAGNOSIS — M47.816 ARTHRITIS OF LUMBAR SPINE: Chronic | ICD-10-CM

## 2023-08-10 DIAGNOSIS — Z79.899 LONG TERM CURRENT USE OF DIURETIC: ICD-10-CM

## 2023-08-10 DIAGNOSIS — M48.02 FORAMINAL STENOSIS OF CERVICAL REGION: ICD-10-CM

## 2023-08-10 DIAGNOSIS — F41.1 GENERALIZED ANXIETY DISORDER: ICD-10-CM

## 2023-08-10 DIAGNOSIS — N18.31 STAGE 3A CHRONIC KIDNEY DISEASE (CKD): ICD-10-CM

## 2023-08-10 DIAGNOSIS — Z23 NEED FOR VACCINATION: ICD-10-CM

## 2023-08-10 DIAGNOSIS — I10 ESSENTIAL HYPERTENSION: ICD-10-CM

## 2023-08-10 DIAGNOSIS — E78.00 HYPERCHOLESTEROLEMIA: ICD-10-CM

## 2023-08-10 DIAGNOSIS — K21.9 GASTROESOPHAGEAL REFLUX DISEASE WITHOUT ESOPHAGITIS: ICD-10-CM

## 2023-08-10 DIAGNOSIS — Z00.00 ANNUAL PHYSICAL EXAM: Primary | ICD-10-CM

## 2023-08-10 LAB
BASOPHILS # BLD AUTO: 0.03 10*3/MM3 (ref 0–0.2)
BASOPHILS NFR BLD AUTO: 0.6 % (ref 0–1.5)
BUN SERPL-MCNC: 11 MG/DL (ref 8–23)
BUN/CREAT SERPL: 8.9 (ref 7–25)
CALCIUM SERPL-MCNC: 10 MG/DL (ref 8.6–10.5)
CHLORIDE SERPL-SCNC: 101 MMOL/L (ref 98–107)
CHOLEST SERPL-MCNC: 171 MG/DL (ref 0–200)
CO2 SERPL-SCNC: 30.4 MMOL/L (ref 22–29)
CREAT SERPL-MCNC: 1.23 MG/DL (ref 0.57–1)
EGFRCR SERPLBLD CKD-EPI 2021: 49.8 ML/MIN/1.73
EOSINOPHIL # BLD AUTO: 0.1 10*3/MM3 (ref 0–0.4)
EOSINOPHIL NFR BLD AUTO: 1.9 % (ref 0.3–6.2)
ERYTHROCYTE [DISTWIDTH] IN BLOOD BY AUTOMATED COUNT: 12.9 % (ref 12.3–15.4)
GLUCOSE SERPL-MCNC: 93 MG/DL (ref 65–99)
HCT VFR BLD AUTO: 41.9 % (ref 34–46.6)
HDLC SERPL-MCNC: 47 MG/DL (ref 40–60)
HGB BLD-MCNC: 14.2 G/DL (ref 12–15.9)
IMM GRANULOCYTES # BLD AUTO: 0.01 10*3/MM3 (ref 0–0.05)
IMM GRANULOCYTES NFR BLD AUTO: 0.2 % (ref 0–0.5)
LDLC SERPL CALC-MCNC: 97 MG/DL (ref 0–100)
LYMPHOCYTES # BLD AUTO: 2.14 10*3/MM3 (ref 0.7–3.1)
LYMPHOCYTES NFR BLD AUTO: 40.8 % (ref 19.6–45.3)
MAGNESIUM SERPL-MCNC: 2.6 MG/DL (ref 1.6–2.4)
MCH RBC QN AUTO: 30.3 PG (ref 26.6–33)
MCHC RBC AUTO-ENTMCNC: 33.9 G/DL (ref 31.5–35.7)
MCV RBC AUTO: 89.5 FL (ref 79–97)
MONOCYTES # BLD AUTO: 0.49 10*3/MM3 (ref 0.1–0.9)
MONOCYTES NFR BLD AUTO: 9.3 % (ref 5–12)
NEUTROPHILS # BLD AUTO: 2.48 10*3/MM3 (ref 1.7–7)
NEUTROPHILS NFR BLD AUTO: 47.2 % (ref 42.7–76)
NRBC BLD AUTO-RTO: 0 /100 WBC (ref 0–0.2)
PLATELET # BLD AUTO: 213 10*3/MM3 (ref 140–450)
POTASSIUM SERPL-SCNC: 4.2 MMOL/L (ref 3.5–5.2)
RBC # BLD AUTO: 4.68 10*6/MM3 (ref 3.77–5.28)
SODIUM SERPL-SCNC: 141 MMOL/L (ref 136–145)
TRIGL SERPL-MCNC: 157 MG/DL (ref 0–150)
VLDLC SERPL CALC-MCNC: 27 MG/DL (ref 5–40)
WBC # BLD AUTO: 5.25 10*3/MM3 (ref 3.4–10.8)

## 2023-08-10 RX ORDER — LOVASTATIN 10 MG/1
10 TABLET ORAL NIGHTLY
Qty: 90 TABLET | Refills: 1 | Status: SHIPPED | OUTPATIENT
Start: 2023-08-10

## 2023-08-10 RX ORDER — DIAZEPAM 10 MG/1
10 TABLET ORAL 3 TIMES DAILY PRN
Qty: 90 TABLET | Refills: 5 | Status: SHIPPED | OUTPATIENT
Start: 2023-08-10

## 2023-08-10 RX ORDER — CYCLOBENZAPRINE HCL 5 MG
5 TABLET ORAL 3 TIMES DAILY PRN
Qty: 90 TABLET | Refills: 5 | Status: SHIPPED | OUTPATIENT
Start: 2023-08-10

## 2023-08-10 RX ORDER — BISOPROLOL FUMARATE AND HYDROCHLOROTHIAZIDE 10; 6.25 MG/1; MG/1
1 TABLET ORAL DAILY
Qty: 30 TABLET | Refills: 5 | Status: SHIPPED | OUTPATIENT
Start: 2023-08-10

## 2023-08-10 RX ORDER — DAPAGLIFLOZIN 10 MG/1
1 TABLET, FILM COATED ORAL DAILY
Qty: 30 TABLET | Refills: 6 | Status: SHIPPED | OUTPATIENT
Start: 2023-08-10

## 2023-08-10 RX ORDER — HYDROCHLOROTHIAZIDE 12.5 MG/1
12.5 CAPSULE, GELATIN COATED ORAL EVERY MORNING
Qty: 90 CAPSULE | Refills: 1 | Status: SHIPPED | OUTPATIENT
Start: 2023-08-10

## 2023-08-10 RX ORDER — FAMOTIDINE 20 MG/1
20 TABLET, FILM COATED ORAL NIGHTLY PRN
Qty: 90 TABLET | Refills: 1 | Status: SHIPPED | OUTPATIENT
Start: 2023-08-10

## 2023-08-10 RX ORDER — VENLAFAXINE HYDROCHLORIDE 75 MG/1
75 CAPSULE, EXTENDED RELEASE ORAL DAILY
Qty: 90 CAPSULE | Refills: 1 | Status: SHIPPED | OUTPATIENT
Start: 2023-08-10

## 2023-08-10 NOTE — PROGRESS NOTES
"Chief Complaint   Patient presents with    Follow-up     Note: pt has had coffee w/ cream today     chronic pain syndrome    Hypertension    Chronic Kidney Disease    Hyperlipidemia    Anxiety    Heartburn       Subjective      Sherri Elkins is a 63 y.o. who presents for an annual physial.    Sleep 6-8 hours per night.     Physical Activity. Mostly through ADL's. No formal exercise program    Diet. Tries to make healthy choices.     The following portions of the patient's history were reviewed and updated as appropriate: allergies, current medications, past family history, past medical history, past social history, past surgical history, and problem list.    Review of Systems   Constitutional: Negative.    HENT: Negative.     Cardiovascular:  Positive for leg swelling.   Endocrine: Negative.    Genitourinary: Negative.    Musculoskeletal:  Positive for back pain.        Leg cramps   Allergic/Immunologic: Negative.    Neurological: Negative.    Hematological: Negative.    Psychiatric/Behavioral: Negative.     All other systems reviewed and are negative.    Objective   Vital Signs:  /65   Pulse 72   Temp 97.7 øF (36.5 øC)   Resp 20   Ht 154.9 cm (60.98\")   Wt 92.7 kg (204 lb 6.4 oz)   BMI 38.64 kg/mý               Physical Exam  Constitutional:       General: She is not in acute distress.     Appearance: Normal appearance. She is not ill-appearing.   HENT:      Head: Normocephalic and atraumatic.      Right Ear: Tympanic membrane and ear canal normal.      Left Ear: Tympanic membrane and ear canal normal.      Nose: Nose normal.      Mouth/Throat:      Mouth: Mucous membranes are moist.      Pharynx: Oropharynx is clear. No posterior oropharyngeal erythema.   Eyes:      Extraocular Movements: Extraocular movements intact.      Conjunctiva/sclera: Conjunctivae normal.      Pupils: Pupils are equal, round, and reactive to light.   Cardiovascular:      Rate and Rhythm: Normal rate and regular rhythm.      " Pulses: Normal pulses.      Heart sounds: Normal heart sounds.   Pulmonary:      Effort: Pulmonary effort is normal. No respiratory distress.      Breath sounds: Normal breath sounds.   Abdominal:      General: Abdomen is flat. Bowel sounds are normal.      Palpations: Abdomen is soft.      Tenderness: There is no abdominal tenderness.   Musculoskeletal:         General: Normal range of motion.      Cervical back: Normal range of motion and neck supple.   Lymphadenopathy:      Cervical: No cervical adenopathy.   Skin:     General: Skin is warm and dry.      Capillary Refill: Capillary refill takes less than 2 seconds.   Neurological:      General: No focal deficit present.      Mental Status: She is alert and oriented to person, place, and time. Mental status is at baseline.      Cranial Nerves: No cranial nerve deficit.      Sensory: No sensory deficit.      Motor: No weakness.   Psychiatric:         Mood and Affect: Mood normal.         Behavior: Behavior normal.         Thought Content: Thought content normal.         Judgment: Judgment normal.        Result Review                     Assessment and Plan  Diagnoses and all orders for this visit:    1. Annual physical exam (Primary)    2. Essential hypertension  -     bisoprolol-hydrochlorothiazide (ZIAC) 10-6.25 MG per tablet; Take 1 tablet by mouth Daily.  Dispense: 30 tablet; Refill: 5  -     hydroCHLOROthiazide (MICROZIDE) 12.5 MG capsule; Take 1 capsule by mouth Every Morning.  Dispense: 90 capsule; Refill: 1    3. Arthritis of lumbar spine  -     cyclobenzaprine (FLEXERIL) 5 MG tablet; Take 1 tablet by mouth 3 (Three) Times a Day As Needed for Muscle Spasms.  Dispense: 90 tablet; Refill: 5    4. Generalized anxiety disorder  -     diazePAM (VALIUM) 10 MG tablet; Take 1 tablet by mouth 3 (Three) Times a Day As Needed for Anxiety.  Dispense: 90 tablet; Refill: 5  -     venlafaxine XR (EFFEXOR-XR) 75 MG 24 hr capsule; Take 1 capsule by mouth Daily. with food   Dispense: 90 capsule; Refill: 1    5. Gastroesophageal reflux disease without esophagitis  Overview:  Controlled.  Pepcid refilled    Orders:  -     famotidine (PEPCID) 20 MG tablet; Take 1 tablet by mouth At Night As Needed for Heartburn.  Dispense: 90 tablet; Refill: 1    6. Stage 3a chronic kidney disease (CKD)  -     Farxiga 10 MG tablet; Take 10 mg by mouth Daily.  Dispense: 30 tablet; Refill: 6  -     CBC & Differential  -     Basic Metabolic Panel  -     Pneumococcal Conjugate Vaccine 20-Valent (PCV20)    7. Chronic pain syndrome  Comments:  Seems improved with weight loss    8. Foraminal stenosis of cervical region    9. Arthritis of lumbar spine  Comments:  Refill Norco.  Caleb reviewed  Orders:  -     cyclobenzaprine (FLEXERIL) 5 MG tablet; Take 1 tablet by mouth 3 (Three) Times a Day As Needed for Muscle Spasms.  Dispense: 90 tablet; Refill: 5    10. Hypercholesterolemia  Comments:  Refill statin  Orders:  -     lovastatin (MEVACOR) 10 MG tablet; Take 1 tablet by mouth Every Night.  Dispense: 90 tablet; Refill: 1  -     Lipid Panel    11. Long term current use of diuretic  -     Magnesium    12. Need for vaccination  -     Pneumococcal Conjugate Vaccine 20-Valent (PCV20)    13. Skin lesion of back  -     Ambulatory Referral to Dermatology    Plan  Prevnar given today. Obtain flu/shingles when available.  Goal of 5 servings of fruit and vegetables per day  Implement regular short walks for exercise  Regular dental and eye exams  Chronic conditions are stable. Renal function and electrolytes ordered for surveillance.        Follow Up  Return in about 6 months (around 2/10/2024) for Next scheduled follow up.  Patient was given instructions and counseling regarding her condition or for health maintenance advice. Please see specific information pulled into the AVS if appropriate.

## 2023-09-05 DIAGNOSIS — M48.02 FORAMINAL STENOSIS OF CERVICAL REGION: ICD-10-CM

## 2023-09-05 DIAGNOSIS — G89.4 CHRONIC PAIN SYNDROME: ICD-10-CM

## 2023-09-05 DIAGNOSIS — M47.816 ARTHRITIS OF LUMBAR SPINE: Chronic | ICD-10-CM

## 2023-09-05 RX ORDER — HYDROCODONE BITARTRATE AND ACETAMINOPHEN 7.5; 325 MG/1; MG/1
1 TABLET ORAL EVERY 8 HOURS PRN
Qty: 90 TABLET | Refills: 0 | Status: SHIPPED | OUTPATIENT
Start: 2023-09-05

## 2023-10-03 DIAGNOSIS — M48.02 FORAMINAL STENOSIS OF CERVICAL REGION: ICD-10-CM

## 2023-10-03 DIAGNOSIS — G89.4 CHRONIC PAIN SYNDROME: ICD-10-CM

## 2023-10-03 DIAGNOSIS — M47.816 ARTHRITIS OF LUMBAR SPINE: Chronic | ICD-10-CM

## 2023-10-04 RX ORDER — HYDROCODONE BITARTRATE AND ACETAMINOPHEN 7.5; 325 MG/1; MG/1
1 TABLET ORAL EVERY 8 HOURS PRN
Qty: 90 TABLET | Refills: 0 | Status: SHIPPED | OUTPATIENT
Start: 2023-10-04

## 2023-11-03 DIAGNOSIS — M47.816 ARTHRITIS OF LUMBAR SPINE: Chronic | ICD-10-CM

## 2023-11-03 DIAGNOSIS — G89.4 CHRONIC PAIN SYNDROME: ICD-10-CM

## 2023-11-03 DIAGNOSIS — M48.02 FORAMINAL STENOSIS OF CERVICAL REGION: ICD-10-CM

## 2023-11-06 RX ORDER — HYDROCODONE BITARTRATE AND ACETAMINOPHEN 7.5; 325 MG/1; MG/1
1 TABLET ORAL EVERY 8 HOURS PRN
Qty: 90 TABLET | Refills: 0 | Status: SHIPPED | OUTPATIENT
Start: 2023-11-06

## 2023-11-09 ENCOUNTER — TELEPHONE (OUTPATIENT)
Dept: FAMILY MEDICINE CLINIC | Facility: CLINIC | Age: 63
End: 2023-11-09
Payer: COMMERCIAL

## 2023-11-15 NOTE — TELEPHONE ENCOUNTER
Request Reference Number: [id number]. HYDROCO/APAP TAB 7.5-325 is approved through 05/09/2024. Your patient may now fill this prescription and it will be covered.

## 2023-11-17 ENCOUNTER — TELEPHONE (OUTPATIENT)
Dept: FAMILY MEDICINE CLINIC | Facility: CLINIC | Age: 63
End: 2023-11-17
Payer: COMMERCIAL

## 2023-11-17 NOTE — TELEPHONE ENCOUNTER
"  Caller: Sherri Elkins \"Edith\"    Relationship: Self    Best call back number: [phone]    What is the best time to reach you: ANYTIME     Who are you requesting to speak with (clinical staff, provider,  specific staff member): CLINICAL STAFF     What was the call regarding: PATIENT STATES THAT THEY WILL E TRANSITIONING TO  INSURANCE. SHE IS NEEDING TO GET A PRIOR AUTHORIZATION TO BE SENT TO [facility] (WHICH WILL BE HER NEW PHARMACY) ON THE [place]     Is it okay if the provider responds through [employer]: YES  "

## 2023-12-01 DIAGNOSIS — G89.4 CHRONIC PAIN SYNDROME: ICD-10-CM

## 2023-12-01 DIAGNOSIS — M47.816 ARTHRITIS OF LUMBAR SPINE: Chronic | ICD-10-CM

## 2023-12-01 DIAGNOSIS — M48.02 FORAMINAL STENOSIS OF CERVICAL REGION: ICD-10-CM

## 2023-12-01 NOTE — TELEPHONE ENCOUNTER
"Caller: Sherri Elkins \"Edith\"    Relationship: Self    Best call back number: [phone]     Requested Prescriptions:   Requested Prescriptions     Pending Prescriptions Disp Refills    HYDROcodone-acetaminophen (NORCO) 7.5-325 MG per tablet 90 tablet 0     Sig: Take 1 tablet by mouth Every 8 (Eight) Hours As Needed for Moderate Pain.      Pharmacy where request should be sent: [address] [phone] [facility] [phone] FX     Last office visit with prescribing clinician: 8/10/2023   Last telemedicine visit with prescribing clinician: Visit date not found   Next office visit with prescribing clinician: 2/12/2024     Additional details provided by patient: THE PATIENT WILL NEED THIS ON 12.06 OR 12.07    THE PATIENT IS NOW ON  UNDER HER HUSBANDS  skilled nursing PLAN. SHE PROVIDED THE INFORMATION BUT DOES NOT KNOW IF IT IS RIGHT. SHE IS GOING TO CALL AND THEN CALL BACK TO PROVIDE HER PROPER INFORMATION. SHE DOES NOT KNOW IF IT IS IN EFFECT YET OR IF IT IS STILL THE ANTHEM.     Does the patient have less than a 3 day supply:  [] Yes  [x] No    Would you like a call back once the refill request has been completed: [] Yes [x] No    If the office needs to give you a call back, can they leave a voicemail: [] Yes [x] No    Rodríguez Edmondson Rep   12/01/23 16:05 EST   "

## 2023-12-02 RX ORDER — HYDROCODONE BITARTRATE AND ACETAMINOPHEN 7.5; 325 MG/1; MG/1
1 TABLET ORAL EVERY 8 HOURS PRN
Qty: 90 TABLET | Refills: 0 | Status: SHIPPED | OUTPATIENT
Start: 2023-12-02

## 2023-12-05 DIAGNOSIS — M47.816 ARTHRITIS OF LUMBAR SPINE: Chronic | ICD-10-CM

## 2023-12-05 DIAGNOSIS — M48.02 FORAMINAL STENOSIS OF CERVICAL REGION: ICD-10-CM

## 2023-12-05 DIAGNOSIS — G89.4 CHRONIC PAIN SYNDROME: ICD-10-CM

## 2023-12-06 RX ORDER — HYDROCODONE BITARTRATE AND ACETAMINOPHEN 7.5; 325 MG/1; MG/1
1 TABLET ORAL EVERY 8 HOURS PRN
Qty: 90 TABLET | Refills: 0 | Status: SHIPPED | OUTPATIENT
Start: 2023-12-06

## 2024-01-02 ENCOUNTER — PATIENT MESSAGE (OUTPATIENT)
Dept: FAMILY MEDICINE CLINIC | Facility: CLINIC | Age: 64
End: 2024-01-02
Payer: COMMERCIAL

## 2024-01-02 DIAGNOSIS — M47.816 ARTHRITIS OF LUMBAR SPINE: Chronic | ICD-10-CM

## 2024-01-02 DIAGNOSIS — G89.4 CHRONIC PAIN SYNDROME: ICD-10-CM

## 2024-01-02 DIAGNOSIS — M48.02 FORAMINAL STENOSIS OF CERVICAL REGION: ICD-10-CM

## 2024-01-03 RX ORDER — HYDROCODONE BITARTRATE AND ACETAMINOPHEN 7.5; 325 MG/1; MG/1
1 TABLET ORAL EVERY 8 HOURS PRN
Qty: 90 TABLET | Refills: 0 | Status: SHIPPED | OUTPATIENT
Start: 2024-01-03

## 2024-01-03 NOTE — TELEPHONE ENCOUNTER
From: Sehrri Elkins  To: Malik Reddy  Sent: 1/2/2024 2:42 PM EST  Subject: [facility]    As of January 2023 I will be using express script thru [facility]. I will need to order at least a week ahead on my medicines because it can take 5 to 7 days in the mail. I spoke to them today my INS is active now. They said the number to call for my refills is ([phone]). All I need at this time is my pain medicine I didn't know how to order because [facility] pharmacy is still on my chart. I tried to take it off but not sure how to do [facility]. This is all new to me. Thankyou an if any questions just call me or the number they ask me to give to you. Thanks for your time. Sincerely, Sherri Elkins

## 2024-01-04 ENCOUNTER — TELEPHONE (OUTPATIENT)
Dept: FAMILY MEDICINE CLINIC | Facility: CLINIC | Age: 64
End: 2024-01-04
Payer: COMMERCIAL

## 2024-01-04 NOTE — TELEPHONE ENCOUNTER
Pharmacy Name: [facility] - [place], [place] - [address] [place] [address] [phone] [facility] [phone]      Reference Number (if applicable): [id number]    Pharmacy representative name: CALIXTO     Pharmacy representative phone number: [phone]    What medication are you calling in regards to: HYDROCODONE     What question does the pharmacy have: THE PHARMACY STATES THAT THE PATIENT REPORTS AN ALLERGY TO CODINE THEY WANT TO MAKE SURE SHE CAN TAKE THE MEDICATION AND THEY WOULD LIKE TO KNOW IF SHE SHOULD START WITH A 7 DAYS SUPPLY SINCE THIS LOOKS LIKE HER FIRST FILL OF THE MEDICATION     Who is the provider that prescribed the medication: DOCTOR VISHAL

## 2024-01-24 DIAGNOSIS — F41.1 GENERALIZED ANXIETY DISORDER: ICD-10-CM

## 2024-01-24 DIAGNOSIS — N18.31 STAGE 3A CHRONIC KIDNEY DISEASE (CKD): ICD-10-CM

## 2024-01-25 RX ORDER — DIAZEPAM 10 MG/1
10 TABLET ORAL 3 TIMES DAILY PRN
Qty: 90 TABLET | Refills: 5 | Status: SHIPPED | OUTPATIENT
Start: 2024-01-25

## 2024-01-25 RX ORDER — DAPAGLIFLOZIN 10 MG/1
1 TABLET, FILM COATED ORAL DAILY
Qty: 90 TABLET | Refills: 1 | Status: SHIPPED | OUTPATIENT
Start: 2024-01-25

## 2024-01-25 RX ORDER — VENLAFAXINE HYDROCHLORIDE 75 MG/1
75 CAPSULE, EXTENDED RELEASE ORAL DAILY
Qty: 90 CAPSULE | Refills: 1 | Status: SHIPPED | OUTPATIENT
Start: 2024-01-25

## 2024-01-30 ENCOUNTER — TELEPHONE (OUTPATIENT)
Dept: FAMILY MEDICINE CLINIC | Facility: CLINIC | Age: 64
End: 2024-01-30
Payer: COMMERCIAL

## 2024-01-30 DIAGNOSIS — N18.31 STAGE 3A CHRONIC KIDNEY DISEASE (CKD): Primary | ICD-10-CM

## 2024-01-30 NOTE — TELEPHONE ENCOUNTER
"  Caller: Sherri Elkins \"Edith\"    Relationship: Self    Best call back number: [phone]     What was the call regarding: F[employer]IGA. PATIENT IS CHECKING ON THE PRIOR AUTHORIZATION NEEDED FOR THIS MEDICATION. PCP NEEDS TO SEND PRIOR AUTH TO INSURANCE. PLEASE CALL WITH AN UPDATE. THIS MEDICATION WOULD BE SENT TO [facility]. PATIENT ONLY HAS 4 PILLS, COULD A WEEK SUPPLY BE SENT TO     Is it okay if the provider responds through [employer]: YES    [address] [place] [address] [phone]  "

## 2024-01-31 NOTE — TELEPHONE ENCOUNTER
Informed pt that [employer] [facility] has not sent a PA request for this. The only one they have sent me was for her Hydrocodone and I got the approval for it. She needs to reach out to [facility] [facility] and just verify the received the Rx and is currently processing it.

## 2024-02-06 ENCOUNTER — TELEPHONE (OUTPATIENT)
Dept: FAMILY MEDICINE CLINIC | Facility: CLINIC | Age: 64
End: 2024-02-06
Payer: COMMERCIAL

## 2024-02-06 DIAGNOSIS — M47.816 ARTHRITIS OF LUMBAR SPINE: Chronic | ICD-10-CM

## 2024-02-06 DIAGNOSIS — M48.02 FORAMINAL STENOSIS OF CERVICAL REGION: ICD-10-CM

## 2024-02-06 DIAGNOSIS — N18.31 STAGE 3A CHRONIC KIDNEY DISEASE (CKD): ICD-10-CM

## 2024-02-06 DIAGNOSIS — G89.4 CHRONIC PAIN SYNDROME: ICD-10-CM

## 2024-02-07 RX ORDER — HYDROCODONE BITARTRATE AND ACETAMINOPHEN 7.5; 325 MG/1; MG/1
1 TABLET ORAL EVERY 8 HOURS PRN
Qty: 90 TABLET | Refills: 0 | Status: SHIPPED | OUTPATIENT
Start: 2024-02-07

## 2024-02-07 RX ORDER — DAPAGLIFLOZIN 10 MG/1
1 TABLET, FILM COATED ORAL DAILY
Qty: 90 TABLET | Refills: 1 | OUTPATIENT
Start: 2024-02-07

## 2024-02-07 NOTE — TELEPHONE ENCOUNTER
Lenard's medications have been sent to her pharmacy. Her insurance required a change from Farxiga to Jardiance

## 2024-02-12 ENCOUNTER — OFFICE VISIT (OUTPATIENT)
Dept: FAMILY MEDICINE CLINIC | Facility: CLINIC | Age: 64
End: 2024-02-12
Payer: OTHER GOVERNMENT

## 2024-02-12 VITALS
WEIGHT: 211.4 LBS | HEART RATE: 62 BPM | BODY MASS INDEX: 39.91 KG/M2 | DIASTOLIC BLOOD PRESSURE: 75 MMHG | HEIGHT: 61 IN | TEMPERATURE: 97.1 F | SYSTOLIC BLOOD PRESSURE: 118 MMHG | RESPIRATION RATE: 20 BRPM | OXYGEN SATURATION: 99 %

## 2024-02-12 DIAGNOSIS — I10 ESSENTIAL HYPERTENSION: Primary | ICD-10-CM

## 2024-02-12 DIAGNOSIS — E78.00 HYPERCHOLESTEROLEMIA: ICD-10-CM

## 2024-02-12 DIAGNOSIS — G89.4 CHRONIC PAIN SYNDROME: ICD-10-CM

## 2024-02-12 DIAGNOSIS — Z79.891 LONG TERM CURRENT USE OF OPIATE ANALGESIC: ICD-10-CM

## 2024-02-12 DIAGNOSIS — F33.1 MODERATE EPISODE OF RECURRENT MAJOR DEPRESSIVE DISORDER: ICD-10-CM

## 2024-02-12 DIAGNOSIS — K21.9 GASTROESOPHAGEAL REFLUX DISEASE WITHOUT ESOPHAGITIS: ICD-10-CM

## 2024-02-12 DIAGNOSIS — M47.816 ARTHRITIS OF LUMBAR SPINE: Chronic | ICD-10-CM

## 2024-02-12 DIAGNOSIS — N18.31 STAGE 3A CHRONIC KIDNEY DISEASE (CKD): ICD-10-CM

## 2024-02-12 LAB
EXPIRATION DATE: NORMAL
Lab: NORMAL
POC CREATININE URINE: NORMAL
POC MICROALBUMIN URINE: NORMAL

## 2024-02-12 PROCEDURE — 99214 OFFICE O/P EST MOD 30 MIN: CPT | Performed by: FAMILY MEDICINE

## 2024-02-12 PROCEDURE — 82044 UR ALBUMIN SEMIQUANTITATIVE: CPT | Performed by: FAMILY MEDICINE

## 2024-02-12 RX ORDER — VENLAFAXINE HYDROCHLORIDE 75 MG/1
75 CAPSULE, EXTENDED RELEASE ORAL DAILY
Qty: 90 CAPSULE | Refills: 1 | Status: SHIPPED | OUTPATIENT
Start: 2024-02-12

## 2024-02-12 RX ORDER — LOVASTATIN 10 MG/1
10 TABLET ORAL NIGHTLY
Qty: 90 TABLET | Refills: 1 | Status: SHIPPED | OUTPATIENT
Start: 2024-02-12

## 2024-02-12 RX ORDER — BISOPROLOL FUMARATE AND HYDROCHLOROTHIAZIDE 10; 6.25 MG/1; MG/1
1 TABLET ORAL DAILY
Qty: 90 TABLET | Refills: 1 | Status: SHIPPED | OUTPATIENT
Start: 2024-02-12

## 2024-02-12 RX ORDER — CYCLOBENZAPRINE HCL 5 MG
5 TABLET ORAL 3 TIMES DAILY PRN
Qty: 270 TABLET | Refills: 1 | Status: SHIPPED | OUTPATIENT
Start: 2024-02-12

## 2024-02-12 RX ORDER — HYDROCHLOROTHIAZIDE 12.5 MG/1
12.5 CAPSULE, GELATIN COATED ORAL EVERY MORNING
Qty: 90 CAPSULE | Refills: 1 | Status: SHIPPED | OUTPATIENT
Start: 2024-02-12

## 2024-02-12 RX ORDER — OMEPRAZOLE 20 MG/1
20 CAPSULE, DELAYED RELEASE ORAL DAILY
Qty: 90 CAPSULE | Refills: 1 | Status: SHIPPED | OUTPATIENT
Start: 2024-02-12

## 2024-02-12 RX ORDER — LAMOTRIGINE 25 MG/1
TABLET ORAL
Qty: 46 TABLET | Refills: 0 | Status: SHIPPED | OUTPATIENT
Start: 2024-02-12 | End: 2024-03-13

## 2024-02-13 LAB
BUN SERPL-MCNC: 14 MG/DL (ref 8–23)
BUN/CREAT SERPL: 15.2 (ref 7–25)
CALCIUM SERPL-MCNC: 9.9 MG/DL (ref 8.6–10.5)
CHLORIDE SERPL-SCNC: 100 MMOL/L (ref 98–107)
CO2 SERPL-SCNC: 29.5 MMOL/L (ref 22–29)
CREAT SERPL-MCNC: 0.92 MG/DL (ref 0.57–1)
EGFRCR SERPLBLD CKD-EPI 2021: 70.5 ML/MIN/1.73
GLUCOSE SERPL-MCNC: 93 MG/DL (ref 65–99)
POTASSIUM SERPL-SCNC: 3.5 MMOL/L (ref 3.5–5.2)
SODIUM SERPL-SCNC: 138 MMOL/L (ref 136–145)
SPECIMEN STATUS: NORMAL

## 2024-02-19 LAB
6MAM SAL CFM-MCNC: NOT DETECTED NG/ML
6MAM SAL QL CFM: NEGATIVE
ALPRAZ SAL CFM-MCNC: NOT DETECTED NG/ML
AMPHET SAL CFM-MCNC: NOT DETECTED NG/ML
AMPHET SAL QL CFM: NEGATIVE
ANTICONVULSANTS SAL QL CFM: NEGATIVE
BENZODIAZ SAL QL CFM: ABNORMAL
BUPRENORPHINE SAL CFM-MCNC: NOT DETECTED NG/ML
BUPRENORPHINE SAL QL CFM: NEGATIVE
BZE SAL CFM-MCNC: NOT DETECTED NG/ML
CANNABINOIDS SAL QL SCN: NEGATIVE
CARBOXYTHC SAL CFM-MCNC: NOT DETECTED NG/ML
CARISOPRODOL SAL CFM-MCNC: NOT DETECTED NG/ML
CLONAZEPAM SAL CFM-MCNC: NOT DETECTED NG/ML
COC+MET SAL QL CFM: NEGATIVE
COCAINE SAL CFM-MCNC: NOT DETECTED NG/ML
CODEINE SAL CFM-MCNC: NOT DETECTED NG/ML
COTININE SAL CFM-MCNC: 61.5 NG/ML
COTININE SAL QL CFM: ABNORMAL
CYCLOBENZAPRINE SAL CFM-MCNC: 1.1 NG/ML
DHC SAL CFM-MCNC: NOT DETECTED NG/ML
DIAZEPAM SAL CFM-MCNC: 0.7 NG/ML
DULOXETINE SAL CFM-MCNC: NOT DETECTED NG/ML
DULOXETINE SAL QL CFM: NEGATIVE
EDDP SAL QL CFM: NOT DETECTED NG/ML
FENTANYL SAL CFM-MCNC: NEGATIVE NG/ML
FENTANYL SAL QL SCN: NOT DETECTED NG/ML
FLUNITRAZEPAM SAL CFM-MCNC: NOT DETECTED NG/ML
FLURAZEPAM SAL CFM-MCNC: NOT DETECTED NG/ML
GABAPENTIN SAL CFM-MCNC: NOT DETECTED UG/ML
HYDROCODONE SAL CFM-MCNC: 3.7 NG/ML
HYDROMORPHONE SAL CFM-MCNC: NOT DETECTED NG/ML
HYPNOTICS SAL QL CFM: NEGATIVE
LORAZEPAM SAL-MCNC: NOT DETECTED NG/ML
MDMA SAL CFM-MCNC: NOT DETECTED NG/ML
MEPERIDINE SAL CFM-MCNC: NOT DETECTED NG/ML
MEPROBAMATE SAL CFM-MCNC: NOT DETECTED NG/ML
METHADONE SAL CFM-MCNC: NOT DETECTED NG/ML
METHADONE SAL QL CFM: NEGATIVE
METHAMPHET SAL CFM-MCNC: NOT DETECTED NG/ML
MIDAZOLAM SAL CFM-MCNC: NOT DETECTED NG/ML
MORPHINE SAL CFM-MCNC: NOT DETECTED NG/ML
MUSCLE RELAXANTS: ABNORMAL
NARCOTICS SAL QL CFM: NEGATIVE
NORBUPRENORPHINE SAL CFM-MCNC: NOT DETECTED NG/ML
NORDIAZEPAM SAL-MCNC: 1.6 NG/ML
NORHYDROCODONE SAL CFM-MCNC: NOT DETECTED NG/ML
NOROXYCODONE SAL CFM-MCNC: NOT DETECTED NG/ML
OPIATES SAL QL CFM: ABNORMAL
OXAZEPAM SAL CFM-MCNC: NOT DETECTED NG/ML
OXYCODONE SAL CFM-MCNC: NOT DETECTED NG/ML
OXYCODONE SAL QL CFM: NEGATIVE
OXYMORPHONE SAL CFM-MCNC: NOT DETECTED NG/ML
PCP SAL CFM-MCNC: NOT DETECTED NG/ML
PCP SAL QL CFM: NEGATIVE
PREGABALIN SAL CFM-MCNC: NOT DETECTED UG/ML
PROPOXYPH SAL CFM-MCNC: NOT DETECTED NG/ML
TAPENTADOL SAL CFM-MCNC: NOT DETECTED NG/ML
TAPENTADOL SAL QL SCN: NEGATIVE
TEMAZEPAM SAL CFM-MCNC: NOT DETECTED NG/ML
TRAMADOL SAL CFM-MCNC: NOT DETECTED NG/ML
TRIAZOLAM SAL CFM-MCNC: NOT DETECTED NG/ML
WRITTEN AUTHORIZATION: NORMAL
ZOLPIDEM SAL CFM-MCNC: NOT DETECTED NG/ML

## 2024-02-24 DIAGNOSIS — F41.1 GENERALIZED ANXIETY DISORDER: ICD-10-CM

## 2024-02-24 DIAGNOSIS — M47.816 ARTHRITIS OF LUMBAR SPINE: Chronic | ICD-10-CM

## 2024-02-24 DIAGNOSIS — M48.02 FORAMINAL STENOSIS OF CERVICAL REGION: ICD-10-CM

## 2024-02-24 DIAGNOSIS — G89.4 CHRONIC PAIN SYNDROME: ICD-10-CM

## 2024-02-24 RX ORDER — DIAZEPAM 10 MG/1
10 TABLET ORAL 3 TIMES DAILY PRN
Qty: 90 TABLET | Refills: 5 | Status: CANCELLED | OUTPATIENT
Start: 2024-02-24

## 2024-02-26 RX ORDER — DIAZEPAM 10 MG/1
10 TABLET ORAL 3 TIMES DAILY PRN
Qty: 90 TABLET | Refills: 5 | Status: SHIPPED | OUTPATIENT
Start: 2024-02-26

## 2024-02-26 RX ORDER — HYDROCODONE BITARTRATE AND ACETAMINOPHEN 7.5; 325 MG/1; MG/1
1 TABLET ORAL EVERY 8 HOURS PRN
Qty: 90 TABLET | Refills: 0 | Status: SHIPPED | OUTPATIENT
Start: 2024-02-26

## 2024-02-27 ENCOUNTER — PATIENT MESSAGE (OUTPATIENT)
Dept: FAMILY MEDICINE CLINIC | Facility: CLINIC | Age: 64
End: 2024-02-27
Payer: OTHER GOVERNMENT

## 2024-02-27 DIAGNOSIS — F33.1 MODERATE EPISODE OF RECURRENT MAJOR DEPRESSIVE DISORDER: Primary | ICD-10-CM

## 2024-02-28 ENCOUNTER — PATIENT MESSAGE (OUTPATIENT)
Dept: FAMILY MEDICINE CLINIC | Facility: CLINIC | Age: 64
End: 2024-02-28
Payer: OTHER GOVERNMENT

## 2024-02-28 DIAGNOSIS — M48.02 FORAMINAL STENOSIS OF CERVICAL REGION: ICD-10-CM

## 2024-02-28 DIAGNOSIS — G89.4 CHRONIC PAIN SYNDROME: ICD-10-CM

## 2024-02-28 DIAGNOSIS — M47.816 ARTHRITIS OF LUMBAR SPINE: Chronic | ICD-10-CM

## 2024-03-04 NOTE — TELEPHONE ENCOUNTER
From: Sherri Elkins  To: Malik Reddy  Sent: 2/27/2024 2:24 PM EST  Subject: Lamotrigine    I started this on 2-17-24,I take at night and on 25th I started a rash on my feet up to my stomach. Intense itching an burning like fire up to my hips about hour after taking . So today the 27th I'm stopping it tonight to see if there's any improvement.I can't stand the itching an burning for hours at night. It's there anything else I can use besides this pill. An what do I use for the rash. Edith Arcos

## 2024-03-05 RX ORDER — HYDROCODONE BITARTRATE AND ACETAMINOPHEN 7.5; 325 MG/1; MG/1
1 TABLET ORAL EVERY 8 HOURS PRN
Qty: 90 TABLET | Refills: 0 | Status: SHIPPED | OUTPATIENT
Start: 2024-03-05

## 2024-03-07 RX ORDER — ARIPIPRAZOLE 2 MG/1
2 TABLET ORAL DAILY
Qty: 30 TABLET | Refills: 1 | Status: SHIPPED | OUTPATIENT
Start: 2024-03-07 | End: 2024-03-07

## 2024-03-07 RX ORDER — ARIPIPRAZOLE 2 MG/1
2 TABLET ORAL DAILY
Qty: 30 TABLET | Refills: 1 | Status: SHIPPED | OUTPATIENT
Start: 2024-03-07

## 2024-03-18 ENCOUNTER — TELEPHONE (OUTPATIENT)
Dept: FAMILY MEDICINE CLINIC | Facility: CLINIC | Age: 64
End: 2024-03-18
Payer: OTHER GOVERNMENT

## 2024-03-18 NOTE — LETTER
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 Please take this into consideration.      Sincerely,        MD Silvia Ugalde Bonnie, Rodríguez Rep  03/18/24 1547  Signed      Caller: Sherri Elkins \"Edith\"    Relationship: Self    Best call back number: [phone]    What form or medical record are you requesting: STATEMENT TO RELEASE HER FROM JURY DUTY     Who is requesting this form or medical record from you: PATIENT     How would you like to receive the form or medical records (pick-up, mail, fax): FAX TO N[facility] THE  FAX -[phone]    THE PATIENT STATES THAT SHE FEELS THAT SHE IS UNABLE TO DO JURY DUTY DUE TO HER ANXIETY AND DEPRESSION THE PATIENT STATES THAT SHE NEEDS THE LETTER BEFORE APRIL 1ST PLEASE CALL PATIENT TO LET HER KNOW IF THAT CAN BE DONE       "

## 2024-03-18 NOTE — TELEPHONE ENCOUNTER
"    Caller: Sherri Elkins \"Edith\"    Relationship: Self    Best call back number: [phone]    What form or medical record are you requesting: STATEMENT TO RELEASE HER FROM JURY DUTY     Who is requesting this form or medical record from you: PATIENT     How would you like to receive the form or medical records (pick-up, mail, fax): FAX TO [facility] THE  FAX -[phone]    THE PATIENT STATES THAT SHE FEELS THAT SHE IS UNABLE TO DO JURY DUTY DUE TO HER ANXIETY AND DEPRESSION THE PATIENT STATES THAT SHE NEEDS THE LETTER BEFORE APRIL 1ST PLEASE CALL PATIENT TO LET HER KNOW IF THAT CAN BE DONE       "

## 2024-03-28 DIAGNOSIS — F41.1 GENERALIZED ANXIETY DISORDER: ICD-10-CM

## 2024-03-28 DIAGNOSIS — M48.02 FORAMINAL STENOSIS OF CERVICAL REGION: ICD-10-CM

## 2024-03-28 DIAGNOSIS — M47.816 ARTHRITIS OF LUMBAR SPINE: Chronic | ICD-10-CM

## 2024-03-28 DIAGNOSIS — G89.4 CHRONIC PAIN SYNDROME: ICD-10-CM

## 2024-03-28 RX ORDER — DIAZEPAM 10 MG/1
10 TABLET ORAL 3 TIMES DAILY PRN
Qty: 90 TABLET | Refills: 5 | Status: SHIPPED | OUTPATIENT
Start: 2024-03-28

## 2024-03-28 RX ORDER — HYDROCODONE BITARTRATE AND ACETAMINOPHEN 7.5; 325 MG/1; MG/1
1 TABLET ORAL EVERY 8 HOURS PRN
Qty: 90 TABLET | Refills: 0 | Status: SHIPPED | OUTPATIENT
Start: 2024-03-28

## 2024-04-05 ENCOUNTER — TELEPHONE (OUTPATIENT)
Dept: FAMILY MEDICINE CLINIC | Facility: CLINIC | Age: 64
End: 2024-04-05
Payer: OTHER GOVERNMENT

## 2024-04-05 DIAGNOSIS — M47.816 ARTHRITIS OF LUMBAR SPINE: Chronic | ICD-10-CM

## 2024-04-05 DIAGNOSIS — M48.02 FORAMINAL STENOSIS OF CERVICAL REGION: ICD-10-CM

## 2024-04-05 DIAGNOSIS — G89.4 CHRONIC PAIN SYNDROME: ICD-10-CM

## 2024-04-05 NOTE — TELEPHONE ENCOUNTER
Pharmacist called because they have had a shortage on her pain medication for awhile. They have difficulty stocking the 7.5/325mg dose. I informed him you are out today and dispense what they have available until you return on Monday and send in a dose change. They will dispense 22 pills today and explain to the patient why a shortened amount.     The 5/325mg dose is not in short supply if you want to send a new Rx over for what remainder would be left.

## 2024-04-08 ENCOUNTER — TELEPHONE (OUTPATIENT)
Dept: FAMILY MEDICINE CLINIC | Facility: CLINIC | Age: 64
End: 2024-04-08
Payer: OTHER GOVERNMENT

## 2024-04-08 RX ORDER — HYDROCODONE BITARTRATE AND ACETAMINOPHEN 5; 325 MG/1; MG/1
1.5 TABLET ORAL EVERY 8 HOURS PRN
Qty: 135 TABLET | Refills: 0 | Status: SHIPPED | OUTPATIENT
Start: 2024-04-08

## 2024-04-08 NOTE — TELEPHONE ENCOUNTER
PATIENT HAS CALLED AND STATED SHE HAS BEEN HAVING SIDE EFFECTS FROM   ARIPiprazole (ABILIFY) 2 MG tablet. PATIENT STATES MEDICATION CAUSED HER TO SWELL FROM HER FEET TO HER KNEES, LOSS OF CONCENTRATION, PAIN IN LEGS AND FEET, LOSS OF SLEEP. PATIENT IS REQUESTING A CALL BACK ASAP TO ADVISE IF PCP IS WANTING HER TO CONTINUE MEDICATION OR TRY AN ALTERNATIVE.     CALL BACK NUMBER [phone]

## 2024-04-08 NOTE — TELEPHONE ENCOUNTER
Pt stated has been taking the medication for 3 weeks-and has bilateral leg edema from knee to feet and very painful-head ache-lots of energy constantly going starts one project and not complete it and go to do 5 more things lack of focus-BP up today 1pm 156/82-no sleep gets 2 hours per night-excessive talking day/night-gets a burst of happiness but not daily

## 2024-04-11 ENCOUNTER — NURSE TRIAGE (OUTPATIENT)
Dept: CALL CENTER | Facility: HOSPITAL | Age: 64
End: 2024-04-11
Payer: OTHER GOVERNMENT

## 2024-04-11 NOTE — TELEPHONE ENCOUNTER
HUB--pt called hub wanting appt with PCP due to swellling and redness in legs and has an appt for tomorrow morning.  Then she states she is having high bp issues to the HUB , so that is a warm transfer for them.  Spoke with patient regarding bp issues but today she is fine it was yesterday and had the swelling/redness and headache yesterday and this past month off and on since being on new med abilify.  Spoke with her MD yesterday or a few days ago and he told her to not take the Abilify anymore.  She has three pills left of the Abilify.  Not taking them.  Pt has an appt [facility] MD tomorrow and knows to call back or go to the ED if her s/s currently get worse.  She voiced understandihng and we discussed protocol.

## 2024-04-11 NOTE — TELEPHONE ENCOUNTER
Reason for Disposition  • Wants doctor to measure BP    Additional Information  • Negative: Difficult to awaken or acting confused (e.g., disoriented, slurred speech)  • Negative: SEVERE difficulty breathing (e.g., struggling for each breath, speaks in single words)  • Negative: [1] Weakness of the face, arm or leg on one side of the body AND [2] new-onset  • Negative: [1] Numbness (i.e., loss of sensation) of the face, arm or leg on one side of the body AND [2] new-onset  • Negative: [1] Chest pain lasts > 5 minutes AND [2] history of heart disease (i.e., heart attack, bypass surgery, angina, angioplasty, CHF)  • Negative: [1] Chest pain AND [2] took nitrogylcerin AND [3] pain was not relieved  • Negative: Sounds like a life-threatening emergency to the triager  • Negative: Symptom is main concern (e.g., headache, chest pain)  • Negative: Low blood pressure is main concern  • Negative: [1] Systolic BP  >= 160 OR Diastolic >= 100 AND [2] cardiac (e.g., breathing difficulty, chest pain) or neurologic symptoms (e.g., new-onset blurred or double vision, unsteady gait)  • Negative: [1] Pregnant 20 or more weeks (or postpartum < 6 weeks) AND [2] new hand or face swelling  • Negative: [1] Pregnant 20 or more weeks (or postpartum < 6 weeks) AND [2] Systolic BP >= 160 OR Diastolic >= 110  • Negative: [1] Systolic BP  >= 200 OR Diastolic >= 120 AND [2] having NO cardiac or neurologic symptoms  • Negative: [1] Pregnant 20 or more weeks (or postpartum < 6 weeks) AND [2] Systolic BP  >= 140 OR Diastolic >= 90  • Negative: [1] Systolic BP  >= 180 OR Diastolic >= 110 AND [2] missed most recent dose of blood pressure medication  • Negative: Systolic BP  >= 180 OR Diastolic >= 110  • Negative: Ran out of BP medications  • Negative: Systolic BP  >= 160 OR Diastolic >= 100  • Negative: [1] Taking BP medications AND [2] feels is having side effects (e.g., impotence, cough, dizzy upon standing)  • Negative: [1] Systolic BP  >= 130 OR  "Diastolic >= 80 AND [2] pregnant  • Negative: [1] Systolic BP  >= 130 OR Diastolic >= 80 AND [2] taking BP medications  • Negative: [1] Systolic BP  >= 130 OR Diastolic >= 80 AND [2] not taking BP medications  • Negative: [1] Systolic BP  >= 130 OR Diastolic >= 80 AND [2] history of heart problems, kidney disease or diabetes    Answer Assessment - Initial Assessment Questions  1. BLOOD PRESSURE: \"What is the blood pressure?\" \"Did you take at least two measurements 5 minutes apart?\"      127/80 last check; HR stays in 80s  2. ONSET: \"When did you take your blood pressure?\"      Yesterday no way to check at this time.  3. HOW: \"How did you take your blood pressure?\" (e.g., automatic home BP monitor, visiting nurse)      Home bp monitor   4. HISTORY: \"Do you have a history of high blood pressure?\"      Yes   5. MEDICINES: \"Are you taking any medicines for blood pressure?\" \"Have you missed any doses recently?\"      Yes and not missed doses but started a new med called Abilify.  Not taking Abilify anymore as of yesterday.    6. OTHER SYMPTOMS: \"Do you have any symptoms?\" (e.g., blurred vision, chest pain, difficulty breathing, headache, weakness)      Headache at times.  7. PREGNANCY: \"Is there any chance you are pregnant?\" \"When was your last menstrual period?\"      N/a    Protocols used: Blood Pressure - High-ADULT-AH    "

## 2024-04-12 ENCOUNTER — OFFICE VISIT (OUTPATIENT)
Dept: FAMILY MEDICINE CLINIC | Facility: CLINIC | Age: 64
End: 2024-04-12
Payer: OTHER GOVERNMENT

## 2024-04-12 VITALS
RESPIRATION RATE: 20 BRPM | HEART RATE: 82 BPM | TEMPERATURE: 97.8 F | OXYGEN SATURATION: 98 % | BODY MASS INDEX: 40.82 KG/M2 | SYSTOLIC BLOOD PRESSURE: 130 MMHG | HEIGHT: 61 IN | DIASTOLIC BLOOD PRESSURE: 80 MMHG | WEIGHT: 216.2 LBS

## 2024-04-12 DIAGNOSIS — Z79.899 LONG TERM CURRENT USE OF DIURETIC: ICD-10-CM

## 2024-04-12 DIAGNOSIS — I87.8 VENOUS STASIS: ICD-10-CM

## 2024-04-12 DIAGNOSIS — K21.9 GASTROESOPHAGEAL REFLUX DISEASE WITHOUT ESOPHAGITIS: ICD-10-CM

## 2024-04-12 DIAGNOSIS — F31.10 BIPOLAR I DISORDER WITH MANIA: ICD-10-CM

## 2024-04-12 DIAGNOSIS — R60.0 LOWER EXTREMITY EDEMA: Primary | ICD-10-CM

## 2024-04-12 LAB
BASOPHILS # BLD AUTO: 0.02 10*3/MM3 (ref 0–0.2)
BASOPHILS NFR BLD AUTO: 0.3 % (ref 0–1.5)
BUN SERPL-MCNC: 14 MG/DL (ref 8–23)
BUN/CREAT SERPL: 11.4 (ref 7–25)
CALCIUM SERPL-MCNC: 10 MG/DL (ref 8.6–10.5)
CHLORIDE SERPL-SCNC: 102 MMOL/L (ref 98–107)
CO2 SERPL-SCNC: 31.5 MMOL/L (ref 22–29)
CREAT SERPL-MCNC: 1.23 MG/DL (ref 0.57–1)
EGFRCR SERPLBLD CKD-EPI 2021: 49.5 ML/MIN/1.73
EOSINOPHIL # BLD AUTO: 0.12 10*3/MM3 (ref 0–0.4)
EOSINOPHIL NFR BLD AUTO: 1.8 % (ref 0.3–6.2)
ERYTHROCYTE [DISTWIDTH] IN BLOOD BY AUTOMATED COUNT: 12.5 % (ref 12.3–15.4)
GLUCOSE SERPL-MCNC: 118 MG/DL (ref 65–99)
HCT VFR BLD AUTO: 40.8 % (ref 34–46.6)
HGB BLD-MCNC: 13.4 G/DL (ref 12–15.9)
IMM GRANULOCYTES # BLD AUTO: 0.03 10*3/MM3 (ref 0–0.05)
IMM GRANULOCYTES NFR BLD AUTO: 0.4 % (ref 0–0.5)
LYMPHOCYTES # BLD AUTO: 1.78 10*3/MM3 (ref 0.7–3.1)
LYMPHOCYTES NFR BLD AUTO: 26.6 % (ref 19.6–45.3)
MCH RBC QN AUTO: 29.6 PG (ref 26.6–33)
MCHC RBC AUTO-ENTMCNC: 32.8 G/DL (ref 31.5–35.7)
MCV RBC AUTO: 90.3 FL (ref 79–97)
MONOCYTES # BLD AUTO: 0.64 10*3/MM3 (ref 0.1–0.9)
MONOCYTES NFR BLD AUTO: 9.6 % (ref 5–12)
NEUTROPHILS # BLD AUTO: 4.09 10*3/MM3 (ref 1.7–7)
NEUTROPHILS NFR BLD AUTO: 61.3 % (ref 42.7–76)
NRBC BLD AUTO-RTO: 0 /100 WBC (ref 0–0.2)
PLATELET # BLD AUTO: 233 10*3/MM3 (ref 140–450)
POTASSIUM SERPL-SCNC: 4.9 MMOL/L (ref 3.5–5.2)
RBC # BLD AUTO: 4.52 10*6/MM3 (ref 3.77–5.28)
SODIUM SERPL-SCNC: 141 MMOL/L (ref 136–145)
WBC # BLD AUTO: 6.68 10*3/MM3 (ref 3.4–10.8)

## 2024-04-12 PROCEDURE — 99214 OFFICE O/P EST MOD 30 MIN: CPT | Performed by: FAMILY MEDICINE

## 2024-04-12 RX ORDER — DIVALPROEX SODIUM 500 MG/1
500 TABLET, DELAYED RELEASE ORAL 2 TIMES DAILY
Qty: 60 TABLET | Refills: 0 | Status: SHIPPED | OUTPATIENT
Start: 2024-04-12

## 2024-04-12 RX ORDER — VENLAFAXINE HYDROCHLORIDE 37.5 MG/1
37.5 TABLET, EXTENDED RELEASE ORAL
Qty: 30 EACH | Refills: 0 | Status: SHIPPED | OUTPATIENT
Start: 2024-04-12

## 2024-04-12 RX ORDER — FUROSEMIDE 40 MG/1
40 TABLET ORAL 2 TIMES DAILY
Qty: 60 TABLET | Refills: 0 | Status: SHIPPED | OUTPATIENT
Start: 2024-04-12

## 2024-04-12 RX ORDER — OMEPRAZOLE 40 MG/1
40 CAPSULE, DELAYED RELEASE ORAL DAILY
Qty: 30 CAPSULE | Refills: 0 | Status: SHIPPED | OUTPATIENT
Start: 2024-04-12

## 2024-04-12 RX ORDER — POTASSIUM CHLORIDE 20 MEQ/1
20 TABLET, EXTENDED RELEASE ORAL DAILY
Qty: 30 TABLET | Refills: 0 | Status: SHIPPED | OUTPATIENT
Start: 2024-04-12

## 2024-04-12 NOTE — PROGRESS NOTES
"Chief Complaint   Patient presents with    Leg redness, swelling    elevated BP        Subjective      Sherri Elkins is a 64 y.o. who presents for concerns over what she believes are medication side effects.  At patient's last visit she was placed on lamotrigine for bipolar depression.  Patient did not tolerate medication as she developed a rash after up titration from 25 mg to 50 mg.  Patient contacted the office at that time.  She was transitioned to Abilify at a low dose of 2 mg.  Within 2 weeks of taking Abilify she developed lower extremity swelling which has progressed to include a rash on the lower extremities as well as discoloration of the distal toe toenails.  Earlier in the week patient was noted to have elevated blood pressure although that seems to have improved by today's appointment.    In addition to the above over the last several weeks patient has developed symptoms of dina which is a first.  She brings in concerns from family members who do not accompany her today over impulsive spending habits, poor concentration, being easily distracted, not requiring sleep, and having an elevated mood.  Patient estimates she spent $1000 that she did not have on [employer].  She will not stop talking according to her .    Review of systems is significant for 5 pound weight gain in the last 2 months    The following portions of the patient's history were reviewed and updated as appropriate: allergies, current medications, past family history, past medical history, past social history, past surgical history, and problem list.    Review of Systems    Objective   Vital Signs:  /80   Pulse 82   Temp 97.8 °F (36.6 °C)   Resp 20   Ht 154.9 cm (60.98\")   Wt 98.1 kg (216 lb 3.2 oz)   SpO2 98%   BMI 40.87 kg/m²               Physical Exam  Vitals reviewed.   Constitutional:       Appearance: Normal appearance.   Cardiovascular:      Rate and Rhythm: Normal rate and regular rhythm.      " Pulses: Normal pulses.           Dorsalis pedis pulses are 2+ on the right side and 2+ on the left side.        Posterior tibial pulses are 2+ on the right side and 2+ on the left side.      Heart sounds: Normal heart sounds.   Pulmonary:      Effort: Pulmonary effort is normal.      Breath sounds: Normal breath sounds.   Musculoskeletal:      Right lower leg: Edema present.      Left lower leg: Edema present.   Skin:     Comments: On the lower legs patient has small red macular rash on both shins.   Neurological:      Mental Status: She is alert.          Result Review                     Assessment and Plan  Diagnoses and all orders for this visit:    1. Lower extremity edema (Primary)  -     furosemide (Lasix) 40 MG tablet; Take 1 tablet by mouth 2 (Two) Times a Day.  Dispense: 60 tablet; Refill: 0  -     Basic Metabolic Panel  -     CBC & Differential    2. Bipolar I disorder with dina  -     divalproex (Depakote) 500 MG DR tablet; Take 1 tablet by mouth 2 (Two) Times a Day.  Dispense: 60 tablet; Refill: 0    3. Venous stasis    4. Long term current use of diuretic  -     potassium chloride (KLOR-CON M20) 20 MEQ CR tablet; Take 1 tablet by mouth Daily.  Dispense: 30 tablet; Refill: 0    5. Gastroesophageal reflux disease without esophagitis  -     omeprazole (priLOSEC) 40 MG capsule; Take 1 capsule by mouth Daily.  Dispense: 30 capsule; Refill: 0    Other orders  -     venlafaxine 37.5 MG tablet sustained-release 24 hour 24 hr tablet; Take 1 tablet by mouth Daily With Breakfast.  Dispense: 30 each; Refill: 0    Plan  1.  Patient's lower extremity edema is a side effect of the Abilify she is taking.  This will be addressed with furosemide 40 mg daily taken in conjunction with a potassium tablet until her weight has decreased 5 pounds at home.  BMP and CBC also been ordered.  2.  Patient is manic in office today.  This is her first documented episode of dina.  She has not tolerated Lamictal or atypical  antipsychotic.  Patient will be started on Depakote 500 mg daily for 4 days then increasing to twice daily and condition will be reassessed in 4 weeks  3.  At the end of the visit patient brought up abdominal discomfort for which she takes omeprazole.  She feels like symptoms have increased slightly.  Omeprazole will be increased to 40 mg and this condition will also be reassessed in 4 weeks.        Follow Up  Return in about 4 weeks (around 5/10/2024) for Next scheduled follow up.  Patient was given instructions and counseling regarding her condition or for health maintenance advice. Please see specific information pulled into the AVS if appropriate.

## 2024-04-22 ENCOUNTER — TELEPHONE (OUTPATIENT)
Dept: FAMILY MEDICINE CLINIC | Facility: CLINIC | Age: 64
End: 2024-04-22
Payer: OTHER GOVERNMENT

## 2024-04-22 NOTE — TELEPHONE ENCOUNTER
"  Caller: Sherri Elkins \"Edith\"    Relationship: Self    Best call back number: [phone]    Which medication are you concerned about: EFFEXOR AND DEPAKOTE    Who prescribed you this medication: VISHAL    What are your concerns: PATIENT STATED THAT SHE HAS BEEN WAITING ON A PA SINCE 4/12/24.  PATIENT WAS TOLD NOT TO START DEPAKOTE UNTIL THE EFFERXOR HAS BEEN HALVED.  PLEASE ADVISE    How long have you had these concerns: SINCE 4/12/24  "

## 2024-04-22 NOTE — TELEPHONE ENCOUNTER
"Today is the first time I have heard anything about her medicine needing a PA.    I logged on to Cover My Meds the only medicine that came up was \"today\" and it was the Venlafaxine ER TABS.    The question is why can she not take the Venlafaxine ER capsules?  If this is appropriate please change and resend for her.and send this back to the pool and we will let her know.  "

## 2024-04-23 RX ORDER — VENLAFAXINE HYDROCHLORIDE 37.5 MG/1
37.5 CAPSULE, EXTENDED RELEASE ORAL DAILY
Qty: 30 CAPSULE | Refills: 0 | Status: SHIPPED | OUTPATIENT
Start: 2024-04-23

## 2024-04-29 DIAGNOSIS — M47.816 ARTHRITIS OF LUMBAR SPINE: Chronic | ICD-10-CM

## 2024-04-29 DIAGNOSIS — M48.02 FORAMINAL STENOSIS OF CERVICAL REGION: ICD-10-CM

## 2024-04-29 DIAGNOSIS — G89.4 CHRONIC PAIN SYNDROME: ICD-10-CM

## 2024-04-29 RX ORDER — HYDROCODONE BITARTRATE AND ACETAMINOPHEN 5; 325 MG/1; MG/1
1.5 TABLET ORAL EVERY 8 HOURS PRN
Qty: 135 TABLET | Refills: 0 | Status: SHIPPED | OUTPATIENT
Start: 2024-04-29

## 2024-05-13 ENCOUNTER — OFFICE VISIT (OUTPATIENT)
Dept: FAMILY MEDICINE CLINIC | Facility: CLINIC | Age: 64
End: 2024-05-13
Payer: OTHER GOVERNMENT

## 2024-05-13 VITALS
SYSTOLIC BLOOD PRESSURE: 108 MMHG | TEMPERATURE: 97.7 F | HEART RATE: 68 BPM | DIASTOLIC BLOOD PRESSURE: 66 MMHG | WEIGHT: 218 LBS | HEIGHT: 61 IN | BODY MASS INDEX: 41.16 KG/M2 | OXYGEN SATURATION: 95 %

## 2024-05-13 DIAGNOSIS — R60.0 LOWER EXTREMITY EDEMA: ICD-10-CM

## 2024-05-13 DIAGNOSIS — F31.10 BIPOLAR I DISORDER WITH MANIA: Primary | ICD-10-CM

## 2024-05-13 DIAGNOSIS — Z79.899 LONG TERM CURRENT USE OF DIURETIC: ICD-10-CM

## 2024-05-13 DIAGNOSIS — K21.9 GASTROESOPHAGEAL REFLUX DISEASE WITHOUT ESOPHAGITIS: ICD-10-CM

## 2024-05-13 DIAGNOSIS — Z51.81 MEDICATION MONITORING ENCOUNTER: ICD-10-CM

## 2024-05-13 LAB — VALPROATE SERPL-MCNC: 108 MCG/ML (ref 50–125)

## 2024-05-13 PROCEDURE — 99214 OFFICE O/P EST MOD 30 MIN: CPT | Performed by: FAMILY MEDICINE

## 2024-05-13 RX ORDER — BISOPROLOL FUMARATE 10 MG/1
10 TABLET, FILM COATED ORAL DAILY
COMMUNITY

## 2024-05-13 RX ORDER — DIVALPROEX SODIUM 500 MG/1
500 TABLET, DELAYED RELEASE ORAL 2 TIMES DAILY
Qty: 60 TABLET | Refills: 2 | Status: SHIPPED | OUTPATIENT
Start: 2024-05-13 | End: 2024-05-13

## 2024-05-13 RX ORDER — FUROSEMIDE 40 MG/1
40 TABLET ORAL 2 TIMES DAILY
Qty: 60 TABLET | Refills: 2 | Status: SHIPPED | OUTPATIENT
Start: 2024-05-13

## 2024-05-13 RX ORDER — POTASSIUM CHLORIDE 20 MEQ/1
20 TABLET, EXTENDED RELEASE ORAL DAILY
Qty: 30 TABLET | Refills: 2 | Status: SHIPPED | OUTPATIENT
Start: 2024-05-13

## 2024-05-13 RX ORDER — OMEPRAZOLE 40 MG/1
40 CAPSULE, DELAYED RELEASE ORAL DAILY
Qty: 30 CAPSULE | Refills: 2 | Status: SHIPPED | OUTPATIENT
Start: 2024-05-13

## 2024-05-13 RX ORDER — POTASSIUM CHLORIDE 20 MEQ/1
20 TABLET, EXTENDED RELEASE ORAL DAILY
Qty: 30 TABLET | Refills: 2 | Status: SHIPPED | OUTPATIENT
Start: 2024-05-13 | End: 2024-05-13

## 2024-05-13 RX ORDER — POTASSIUM CHLORIDE 20 MEQ/1
20 TABLET, EXTENDED RELEASE ORAL DAILY
Qty: 90 TABLET | OUTPATIENT
Start: 2024-05-13

## 2024-05-13 RX ORDER — FUROSEMIDE 40 MG/1
40 TABLET ORAL 2 TIMES DAILY
Qty: 60 TABLET | Refills: 2 | Status: SHIPPED | OUTPATIENT
Start: 2024-05-13 | End: 2024-05-13

## 2024-05-13 RX ORDER — DIVALPROEX SODIUM 500 MG/1
500 TABLET, DELAYED RELEASE ORAL 2 TIMES DAILY
Qty: 60 TABLET | Refills: 2 | Status: SHIPPED | OUTPATIENT
Start: 2024-05-13

## 2024-05-13 RX ORDER — OMEPRAZOLE 40 MG/1
40 CAPSULE, DELAYED RELEASE ORAL DAILY
Qty: 30 CAPSULE | Refills: 2 | Status: SHIPPED | OUTPATIENT
Start: 2024-05-13 | End: 2024-05-13

## 2024-05-13 NOTE — PROGRESS NOTES
"Chief Complaint   Patient presents with    Follow-up     Pt states she is having swelling in her feet.       Subjective      Sherri Elkins is a 64 y.o. who presents for follow-up of bipolar dina.  At last visit patient begin weaning from Effexor XR and was started on Depakote due to intolerance to lamotrigine and swelling the patient believes was associated with Abilify.  However patient continues to complain of swelling today.  Regarding her mood she is back to being depressed with difficulty sleeping.  She admits when she is manic it was the happiest she can recall being in quite some time.    The following portions of the patient's history were reviewed and updated as appropriate: allergies, current medications, past family history, past medical history, past social history, past surgical history, and problem list.    Review of Systems    Objective   Vital Signs:  /66 (BP Location: Left arm, Patient Position: Sitting)   Pulse 68   Temp 97.7 °F (36.5 °C)   Ht 154.9 cm (60.98\")   Wt 98.9 kg (218 lb)   SpO2 95%   BMI 41.21 kg/m²               Physical Exam  Vitals reviewed.   Neurological:      Mental Status: She is alert.   Psychiatric:         Attention and Perception: Attention normal.         Mood and Affect: Mood is depressed. Affect is flat.         Speech: Speech normal.         Behavior: Behavior is slowed.         Cognition and Memory: Cognition normal.          Result Review                     Assessment and Plan  Diagnoses and all orders for this visit:    1. Bipolar I disorder with dina (Primary)  -     Discontinue: divalproex (Depakote) 500 MG DR tablet; Take 1 tablet by mouth 2 (Two) Times a Day.  Dispense: 60 tablet; Refill: 2  -     Ambulatory Referral to Behavioral Health  -     Cancel: Valproic Acid Level, Total; Future  -     Valproic Acid Level, Total  -     divalproex (Depakote) 500 MG DR tablet; Take 1 tablet by mouth 2 (Two) Times a Day.  Dispense: 60 tablet; Refill: 2    2. " Lower extremity edema  -     Discontinue: furosemide (Lasix) 40 MG tablet; Take 1 tablet by mouth 2 (Two) Times a Day.  Dispense: 60 tablet; Refill: 2  -     furosemide (Lasix) 40 MG tablet; Take 1 tablet by mouth 2 (Two) Times a Day.  Dispense: 60 tablet; Refill: 2    3. Gastroesophageal reflux disease without esophagitis  -     Discontinue: omeprazole (priLOSEC) 40 MG capsule; Take 1 capsule by mouth Daily.  Dispense: 30 capsule; Refill: 2  -     omeprazole (priLOSEC) 40 MG capsule; Take 1 capsule by mouth Daily.  Dispense: 30 capsule; Refill: 2    4. Long term current use of diuretic  -     Discontinue: potassium chloride (KLOR-CON M20) 20 MEQ CR tablet; Take 1 tablet by mouth Daily.  Dispense: 30 tablet; Refill: 2  -     potassium chloride (KLOR-CON M20) 20 MEQ CR tablet; Take 1 tablet by mouth Daily.  Dispense: 30 tablet; Refill: 2    5. Medication monitoring encounter  -     Cancel: Valproic Acid Level, Total; Future  -     Valproic Acid Level, Total    Plan  1.  Patient is no longer manic.  Continue Depakote with monitoring of Depakote level today and adjustment to medication if needed  2.  Due to her medication intolerances unguinal refer her to behavioral health for assistance with medications for her bipolar disorder  3.  Patient needed a few refills today which were given.  I did encourage increased ambulation to help with lower extremity swelling        Follow Up  No follow-ups on file.  Patient was given instructions and counseling regarding her condition or for health maintenance advice. Please see specific information pulled into the AVS if appropriate.

## 2024-05-23 ENCOUNTER — TELEPHONE (OUTPATIENT)
Dept: FAMILY MEDICINE CLINIC | Facility: CLINIC | Age: 64
End: 2024-05-23
Payer: OTHER GOVERNMENT

## 2024-05-23 DIAGNOSIS — F31.10 BIPOLAR I DISORDER WITH MANIA: ICD-10-CM

## 2024-05-23 DIAGNOSIS — F31.10 BIPOLAR I DISORDER WITH MANIA: Primary | ICD-10-CM

## 2024-05-23 RX ORDER — OLANZAPINE AND FLUOXETINE 6; 25 MG/1; MG/1
1 CAPSULE ORAL EVERY EVENING
Qty: 30 CAPSULE | Refills: 0 | Status: SHIPPED | OUTPATIENT
Start: 2024-05-23 | End: 2024-05-23 | Stop reason: SDUPTHER

## 2024-05-23 RX ORDER — QUETIAPINE FUMARATE 50 MG/1
50 TABLET, FILM COATED ORAL NIGHTLY
Qty: 30 TABLET | Refills: 0 | Status: SHIPPED | OUTPATIENT
Start: 2024-05-23 | End: 2024-05-23

## 2024-05-23 RX ORDER — OLANZAPINE AND FLUOXETINE 6; 25 MG/1; MG/1
1 CAPSULE ORAL EVERY EVENING
Qty: 30 CAPSULE | Refills: 0 | Status: SHIPPED | OUTPATIENT
Start: 2024-05-23 | End: 2024-05-24 | Stop reason: ALTCHOICE

## 2024-05-23 NOTE — TELEPHONE ENCOUNTER
I will prescribe an alternative medicine to see if we can get her to her appointment with our psychiatric nurse practitioner next month.  I do not want her to restart taking Effexor.  She may stop the divalproex

## 2024-05-23 NOTE — TELEPHONE ENCOUNTER
The medication has been sent to [facility].  It should help with the withdrawal from Effexor

## 2024-05-23 NOTE — TELEPHONE ENCOUNTER
Patient wants medication sent to [facility] or she has to wait for 2 weeks to receive it. She needs to know if this will help her withdrawals from the Effexor. I am calling [facility] to cancel the current order if possible. Please advise.

## 2024-05-23 NOTE — TELEPHONE ENCOUNTER
"Caller: Sherri Elkins \"Edith\"    Relationship: Self    Best call back number: [phone]     Which medication are you concerned about: divalproex (Depakote) 500 MG DR tablet    Who prescribed you this medication: DR. RODGERS    When did you start taking this medication: TWO WEEKS AGO    What are your concerns: THE PATIENT SAID THAT THIS MEDICATION IS NOT REALLY DOING ANYTHING FOR HER.     ADVERSELY, SHE IS HAVING BAD WITH DRAWLS FROM GOING OFF OF EFFEXOR. SHE IS HAVING BRAIN ZAPS, WHOOSHING FEELING, VERY DIZZY, TROUBLE SLEEPING AT NIGHT AND SHE HAS TO HAVE HEADPHONES IN HER EARS TO KEEP OUT THE NOISE. THIS HAPPENED ONCE BEFORE WHEN SHE MISSED A DOSE OF IT. SHE WANTS TO GO OFF OF THE DEPAKOTE AND WOULD LIKE TO BE PUT BACK ON THE EFFEXOR.    IF SHE CAN BE PUT BACK ON CAN A PRESCRIPTION PLEASE BE SENT TO THE PHARMACY ASAP? PLEASE CALL PATIENT ASAP.     [id number] - [place], [place] - [address] [facility] [address] [facility] [address] - [phone] [facility] [phone] FX      How long have you had these concerns: GOING ON FOR A WEEK            "

## 2024-05-24 ENCOUNTER — TELEPHONE (OUTPATIENT)
Dept: FAMILY MEDICINE CLINIC | Facility: CLINIC | Age: 64
End: 2024-05-24
Payer: OTHER GOVERNMENT

## 2024-05-24 DIAGNOSIS — F31.10 BIPOLAR I DISORDER WITH MANIA: Primary | ICD-10-CM

## 2024-05-24 RX ORDER — OLANZAPINE 5 MG/1
5 TABLET ORAL NIGHTLY
Qty: 30 TABLET | Refills: 1 | Status: SHIPPED | OUTPATIENT
Start: 2024-05-24

## 2024-05-24 RX ORDER — FLUOXETINE HYDROCHLORIDE 20 MG/1
20 CAPSULE ORAL DAILY
Qty: 30 CAPSULE | Refills: 1 | Status: SHIPPED | OUTPATIENT
Start: 2024-05-24

## 2024-05-24 NOTE — TELEPHONE ENCOUNTER
Pt's daughter calling, states that they called just about every pharmacy they could call to find this medication but are unable to find it anywhere:    OLANZapine-FLUoxetine (SYMBYAX) 6-25 MG per capsule     Wondering if these could be called in separately.    Daughter also would like a call back once this has been worked.  She states that patient has been too depressed to deal with anything involving her medications so best to call her back on this number:    Can call back  [phone]       Can send rx to:  [id number] - [place], [place] - [address] [address] [address] AT [address] & [facility] - [phone] [facility] [phone] FX

## 2024-05-24 NOTE — TELEPHONE ENCOUNTER
Informed pt's daughter the new Rx's have been sent in but did not release any details dealing with the medication or reason for need etc. . .

## 2024-06-07 DIAGNOSIS — G89.4 CHRONIC PAIN SYNDROME: ICD-10-CM

## 2024-06-07 DIAGNOSIS — M48.02 FORAMINAL STENOSIS OF CERVICAL REGION: ICD-10-CM

## 2024-06-07 DIAGNOSIS — M47.816 ARTHRITIS OF LUMBAR SPINE: Chronic | ICD-10-CM

## 2024-06-07 RX ORDER — HYDROCODONE BITARTRATE AND ACETAMINOPHEN 5; 325 MG/1; MG/1
1.5 TABLET ORAL EVERY 8 HOURS PRN
Qty: 135 TABLET | Refills: 0 | Status: SHIPPED | OUTPATIENT
Start: 2024-06-07

## 2024-06-17 DIAGNOSIS — F31.10 BIPOLAR I DISORDER WITH MANIA: ICD-10-CM

## 2024-06-17 RX ORDER — QUETIAPINE FUMARATE 50 MG/1
50 TABLET, FILM COATED ORAL NIGHTLY
Qty: 30 TABLET | Refills: 0 | OUTPATIENT
Start: 2024-06-17

## 2024-06-18 ENCOUNTER — OFFICE VISIT (OUTPATIENT)
Dept: BEHAVIORAL HEALTH | Facility: CLINIC | Age: 64
End: 2024-06-18
Payer: OTHER GOVERNMENT

## 2024-06-18 VITALS
SYSTOLIC BLOOD PRESSURE: 127 MMHG | BODY MASS INDEX: 41.16 KG/M2 | WEIGHT: 218 LBS | DIASTOLIC BLOOD PRESSURE: 79 MMHG | HEIGHT: 61 IN | HEART RATE: 83 BPM

## 2024-06-18 DIAGNOSIS — R06.81 WITNESSED EPISODE OF APNEA: ICD-10-CM

## 2024-06-18 DIAGNOSIS — F31.31 BIPOLAR 1 DISORDER, DEPRESSED, MILD: Primary | ICD-10-CM

## 2024-06-18 DIAGNOSIS — G47.9 SLEEP DISTURBANCES: ICD-10-CM

## 2024-06-18 DIAGNOSIS — R06.83 SNORING: ICD-10-CM

## 2024-06-18 PROCEDURE — 90792 PSYCH DIAG EVAL W/MED SRVCS: CPT

## 2024-06-18 RX ORDER — QUETIAPINE FUMARATE 50 MG/1
50 TABLET, FILM COATED ORAL NIGHTLY
COMMUNITY
Start: 2024-05-23

## 2024-06-18 RX ORDER — ZIPRASIDONE HYDROCHLORIDE 40 MG/1
40 CAPSULE ORAL 2 TIMES DAILY WITH MEALS
Qty: 60 CAPSULE | Refills: 1 | Status: SHIPPED | OUTPATIENT
Start: 2024-06-18
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
## 2024-06-25 ENCOUNTER — TELEPHONE (OUTPATIENT)
Dept: FAMILY MEDICINE CLINIC | Facility: CLINIC | Age: 64
End: 2024-06-25

## 2024-06-25 NOTE — TELEPHONE ENCOUNTER
"    Caller: Sherri Elkins \"Edith\"    Relationship: Self    Best call back number: [phone]    Which medication are you concerned about: PT CALLED TO VERIFY IS SHE SHOULD BE TAKING RX'S  QUEtiapine (SEROquel) 50 MG tablet  AND/OR   FLUOXETINE 20MG, PT STATED THAT SHE TAKING RX  ziprasidone (GEODON) 40 MG capsule  AND WILL LIKE TOT KNOW IF THIS IS THE REASON SHE SHOULD NOT BE TAKING QUEtiapine (SEROquel) 50 MG tablet        "

## 2024-06-26 DIAGNOSIS — F31.31 BIPOLAR 1 DISORDER, DEPRESSED, MILD: ICD-10-CM

## 2024-06-27 RX ORDER — ZIPRASIDONE HYDROCHLORIDE 40 MG/1
40 CAPSULE ORAL 2 TIMES DAILY WITH MEALS
Qty: 60 CAPSULE | Refills: 1 | Status: SHIPPED | OUTPATIENT
Start: 2024-06-27

## 2024-07-04 DIAGNOSIS — M47.816 ARTHRITIS OF LUMBAR SPINE: Chronic | ICD-10-CM

## 2024-07-04 DIAGNOSIS — G89.4 CHRONIC PAIN SYNDROME: ICD-10-CM

## 2024-07-04 DIAGNOSIS — M48.02 FORAMINAL STENOSIS OF CERVICAL REGION: ICD-10-CM

## 2024-07-05 RX ORDER — HYDROCODONE BITARTRATE AND ACETAMINOPHEN 5; 325 MG/1; MG/1
1.5 TABLET ORAL EVERY 8 HOURS PRN
Qty: 135 TABLET | Refills: 0 | Status: SHIPPED | OUTPATIENT
Start: 2024-07-05

## 2024-07-11 RX ORDER — EMPAGLIFLOZIN 10 MG/1
10 TABLET, FILM COATED ORAL DAILY
Qty: 90 TABLET | Refills: 0 | Status: SHIPPED | OUTPATIENT
Start: 2024-07-11

## 2024-07-18 ENCOUNTER — OFFICE VISIT (OUTPATIENT)
Dept: BEHAVIORAL HEALTH | Facility: CLINIC | Age: 64
End: 2024-07-18
Payer: OTHER GOVERNMENT

## 2024-07-18 VITALS
SYSTOLIC BLOOD PRESSURE: 127 MMHG | WEIGHT: 218 LBS | BODY MASS INDEX: 41.16 KG/M2 | DIASTOLIC BLOOD PRESSURE: 97 MMHG | HEIGHT: 61 IN

## 2024-07-18 DIAGNOSIS — F31.31 BIPOLAR 1 DISORDER, DEPRESSED, MILD: ICD-10-CM

## 2024-07-18 DIAGNOSIS — G47.9 SLEEP DISTURBANCES: Primary | ICD-10-CM

## 2024-07-18 RX ORDER — AMITRIPTYLINE HYDROCHLORIDE 25 MG/1
25 TABLET, FILM COATED ORAL NIGHTLY
Qty: 60 TABLET | Refills: 1 | Status: SHIPPED | OUTPATIENT
Start: 2024-07-18 | End: 2024-07-18

## 2024-07-18 RX ORDER — ZIPRASIDONE HYDROCHLORIDE 40 MG/1
40 CAPSULE ORAL 2 TIMES DAILY WITH MEALS
Qty: 120 CAPSULE | Refills: 2 | Status: SHIPPED | OUTPATIENT
Start: 2024-07-18 | End: 2024-07-18

## 2024-07-18 RX ORDER — ZIPRASIDONE HYDROCHLORIDE 40 MG/1
40 CAPSULE ORAL 2 TIMES DAILY WITH MEALS
Qty: 120 CAPSULE | Refills: 2 | Status: SHIPPED | OUTPATIENT
Start: 2024-07-18

## 2024-07-18 RX ORDER — AMITRIPTYLINE HYDROCHLORIDE 25 MG/1
25 TABLET, FILM COATED ORAL NIGHTLY
Qty: 60 TABLET | Refills: 1 | Status: SHIPPED | OUTPATIENT
Start: 2024-07-18

## 2024-07-18 NOTE — PROGRESS NOTES
Office  Follow Up Visit      Patient Name: Sherri Elkins  : 1960   MRN: [id number]     Referring Provider: Malik Reddy MD    Chief Complaint: Medication follow-up    ICD-10-CM ICD-9-CM   1. Sleep disturbances  G47.9 780.50   2. Bipolar 1 disorder, depressed, mild  F31.31 296.51        History of Present Illness:   Sherri Elkins is a 64 y.o. female who is here today for follow up related to medication management of bipolar 1 disorder and sleep disturbances.  Current medication regimen include ziprasidone 40 mg twice daily and quetiapine 50 mg nightly.    Patient reports that she has not noticed any significant changes since last month after switching from olanzapine to ziprasidone related to weight gain and sedation.  Patient reports that she feels she is still gained weight but does feel a little bit less sedated.  She reports continued fatigue, difficulties with sleep onset and sustaining sleep.  She reports that she did get a scheduled appointment with sleep medicine next week for possible sleep apnea.  She reports that her mood has remained stable, denies any manic symptomology, or any psychosis.  She reports that she still does feel a little bit depressed at times.      Subjective      Review of Systems:   Review of Systems   Constitutional:  Positive for fatigue and unexpected weight change.   Respiratory: Negative.     Cardiovascular: Negative.    Gastrointestinal: Negative.    Genitourinary: Negative.    Musculoskeletal: Negative.    Neurological: Negative.    Psychiatric/Behavioral:  Positive for sleep disturbance.        Patient History:   The following portions of the patient's history were reviewed and updated as appropriate: allergies, current medications, past family history, past medical history, past social history, past surgical history and problem list.     Social:  No change in interval history.    Medications:     Current Outpatient Medications:     amitriptyline (ELAVIL)  "25 MG tablet, Take 1 tablet by mouth Every Night., Disp: 60 tablet, Rfl: 1    ziprasidone (GEODON) 40 MG capsule, Take 1 capsule by mouth 2 (Two) Times a Day With Meals., Disp: 120 capsule, Rfl: 2    bisoprolol (ZEBeta) 10 MG tablet, Take 1 tablet by mouth Daily., Disp: , Rfl:     bisoprolol-hydrochlorothiazide (ZIAC) 10-6.25 MG per tablet, Take 1 tablet by mouth Daily., Disp: 90 tablet, Rfl: 1    cyclobenzaprine (FLEXERIL) 5 MG tablet, Take 1 tablet by mouth 3 (Three) Times a Day As Needed for Muscle Spasms., Disp: 270 tablet, Rfl: 1    diazePAM (VALIUM) 10 MG tablet, Take 1 tablet by mouth 3 (Three) Times a Day As Needed for Anxiety., Disp: 90 tablet, Rfl: 5    empagliflozin (Jardiance) 10 MG tablet tablet, TAKE 1 TABLET DAILY, Disp: 90 tablet, Rfl: 0    furosemide (Lasix) 40 MG tablet, Take 1 tablet by mouth 2 (Two) Times a Day., Disp: 60 tablet, Rfl: 2    hydroCHLOROthiazide (MICROZIDE) 12.5 MG capsule, Take 1 capsule by mouth Every Morning., Disp: 90 capsule, Rfl: 1    HYDROcodone-acetaminophen (Norco) 5-325 MG per tablet, Take 1.5 tablets by mouth Every 8 (Eight) Hours As Needed for Severe Pain., Disp: 135 tablet, Rfl: 0    lovastatin (MEVACOR) 10 MG tablet, Take 1 tablet by mouth Every Night., Disp: 90 tablet, Rfl: 1    omeprazole (priLOSEC) 40 MG capsule, Take 1 capsule by mouth Daily., Disp: 30 capsule, Rfl: 2    potassium chloride (KLOR-CON M20) 20 MEQ CR tablet, Take 1 tablet by mouth Daily., Disp: 30 tablet, Rfl: 2    Objective     Physical Exam:  Vital Signs:   Vitals:    07/18/24 0922   BP: 127/97   Weight: 98.9 kg (218 lb)   Height: 154.9 cm (61\")     Body mass index is 41.19 kg/m².     Mental Status Exam:   Hygiene:   good  Cooperation:  Cooperative  Eye Contact:  Good  Psychomotor Behavior:  Appropriate  Affect:  Appropriate  Mood: normal  Speech:  Normal  Thought Process:  Goal directed  Thought Content:  Normal  Suicidal:  None  Homicidal:  None  Hallucinations:  None  Delusion:  None  Memory:  " Intact  Orientation:  Grossly intact  Reliability:  good  Insight:  Fair  Judgement:  Good  Impulse Control:  Good  Physical/Medical Issues:  Yes several medical comorbidities, see chart      Assessment / Plan      Visit Diagnosis/Orders Placed This Visit:  Diagnoses and all orders for this visit:    1. Sleep disturbances (Primary)  -     Discontinue: amitriptyline (ELAVIL) 25 MG tablet; Take 1 tablet by mouth Every Night.  Dispense: 60 tablet; Refill: 1  -     amitriptyline (ELAVIL) 25 MG tablet; Take 1 tablet by mouth Every Night.  Dispense: 60 tablet; Refill: 1    2. Bipolar 1 disorder, depressed, mild  -     Discontinue: ziprasidone (GEODON) 40 MG capsule; Take 1 capsule by mouth 2 (Two) Times a Day With Meals.  Dispense: 120 capsule; Refill: 2  -     ziprasidone (GEODON) 40 MG capsule; Take 1 capsule by mouth 2 (Two) Times a Day With Meals.  Dispense: 120 capsule; Refill: 2         Differential:   Sleep apnea    Plan:   Discontinue quetiapine.  Patient amitriptyline 25 mg nightly.  Continue ziprasidone 40 mg twice daily.    Continue supportive psychotherapy efforts and medications as indicated. Treatment and medication options discussed during today's visit. Patient ackowledged and verbally consented to continue with current treatment plan and was educated on the importance of compliance with treatment and follow-up appointments. Patient seems reasonably able to adhere to treatment plan.      Medication Considerations:  Discussed medication options and treatment plan of prescribed medication(s) as well as the risks, benefits, and potential side effects. Patient is agreeable to call the office with any worsening of symptoms or onset of side effects. Patient is agreeable to call 911 or go to the nearest ER should he/she begin having SI/HI.    Quality Measures:   Light Smoker less than 3 minutes spent counseling Will try to cut down    I advised Sherri Elkins of the risks of tobacco use.     Follow Up:   Return in  about 1 month (around 8/18/2024) for Recheck.      JONAH Hernández, PMHNP-BC

## 2024-07-24 DIAGNOSIS — R60.0 LOWER EXTREMITY EDEMA: ICD-10-CM

## 2024-07-24 DIAGNOSIS — F31.10 BIPOLAR I DISORDER WITH MANIA: ICD-10-CM

## 2024-07-24 RX ORDER — FUROSEMIDE 40 MG/1
40 TABLET ORAL 2 TIMES DAILY
Qty: 60 TABLET | Refills: 0 | Status: SHIPPED | OUTPATIENT
Start: 2024-07-24

## 2024-07-24 RX ORDER — FLUOXETINE HYDROCHLORIDE 20 MG/1
20 CAPSULE ORAL DAILY
Qty: 30 CAPSULE | Refills: 1 | OUTPATIENT
Start: 2024-07-24

## 2024-08-05 DIAGNOSIS — M47.816 ARTHRITIS OF LUMBAR SPINE: Chronic | ICD-10-CM

## 2024-08-05 DIAGNOSIS — G89.4 CHRONIC PAIN SYNDROME: ICD-10-CM

## 2024-08-05 DIAGNOSIS — M48.02 FORAMINAL STENOSIS OF CERVICAL REGION: ICD-10-CM

## 2024-08-05 RX ORDER — HYDROCODONE BITARTRATE AND ACETAMINOPHEN 5; 325 MG/1; MG/1
1.5 TABLET ORAL EVERY 8 HOURS PRN
Qty: 135 TABLET | Refills: 0 | Status: SHIPPED | OUTPATIENT
Start: 2024-08-05

## 2024-08-19 ENCOUNTER — OFFICE VISIT (OUTPATIENT)
Dept: FAMILY MEDICINE CLINIC | Facility: CLINIC | Age: 64
End: 2024-08-19
Payer: OTHER GOVERNMENT

## 2024-08-19 VITALS
BODY MASS INDEX: 41.01 KG/M2 | HEIGHT: 61 IN | OXYGEN SATURATION: 97 % | RESPIRATION RATE: 20 BRPM | WEIGHT: 217.2 LBS | TEMPERATURE: 97.1 F | SYSTOLIC BLOOD PRESSURE: 118 MMHG | HEART RATE: 80 BPM | DIASTOLIC BLOOD PRESSURE: 60 MMHG

## 2024-08-19 DIAGNOSIS — R13.19 ESOPHAGEAL DYSPHAGIA: ICD-10-CM

## 2024-08-19 DIAGNOSIS — E66.01 CLASS 3 OBESITY: ICD-10-CM

## 2024-08-19 DIAGNOSIS — E78.00 HYPERCHOLESTEROLEMIA: ICD-10-CM

## 2024-08-19 DIAGNOSIS — F41.1 GENERALIZED ANXIETY DISORDER: ICD-10-CM

## 2024-08-19 DIAGNOSIS — N18.31 STAGE 3A CHRONIC KIDNEY DISEASE (CKD): ICD-10-CM

## 2024-08-19 DIAGNOSIS — Z79.899 LONG TERM CURRENT USE OF DIURETIC: ICD-10-CM

## 2024-08-19 DIAGNOSIS — G89.4 CHRONIC PAIN SYNDROME: ICD-10-CM

## 2024-08-19 DIAGNOSIS — K21.9 GASTROESOPHAGEAL REFLUX DISEASE WITHOUT ESOPHAGITIS: ICD-10-CM

## 2024-08-19 DIAGNOSIS — R60.0 LOWER EXTREMITY EDEMA: ICD-10-CM

## 2024-08-19 DIAGNOSIS — M47.816 ARTHRITIS OF LUMBAR SPINE: Chronic | ICD-10-CM

## 2024-08-19 DIAGNOSIS — I10 ESSENTIAL HYPERTENSION: Primary | ICD-10-CM

## 2024-08-19 PROBLEM — E66.813 CLASS 3 OBESITY: Status: ACTIVE | Noted: 2023-02-09

## 2024-08-19 PROCEDURE — 99214 OFFICE O/P EST MOD 30 MIN: CPT | Performed by: FAMILY MEDICINE

## 2024-08-19 RX ORDER — DIAZEPAM 10 MG/1
10 TABLET ORAL 3 TIMES DAILY PRN
Qty: 90 TABLET | Refills: 5 | Status: SHIPPED | OUTPATIENT
Start: 2024-08-19

## 2024-08-19 RX ORDER — HYDROCHLOROTHIAZIDE 12.5 MG/1
12.5 CAPSULE, GELATIN COATED ORAL EVERY MORNING
Qty: 90 CAPSULE | Refills: 1 | Status: SHIPPED | OUTPATIENT
Start: 2024-08-19

## 2024-08-19 RX ORDER — FUROSEMIDE 40 MG/1
40 TABLET ORAL DAILY
Qty: 90 TABLET | Refills: 1 | Status: SHIPPED | OUTPATIENT
Start: 2024-08-19

## 2024-08-19 RX ORDER — OMEPRAZOLE 40 MG/1
40 CAPSULE, DELAYED RELEASE ORAL DAILY
Qty: 90 CAPSULE | Refills: 1 | Status: SHIPPED | OUTPATIENT
Start: 2024-08-19

## 2024-08-19 RX ORDER — FAMOTIDINE 20 MG/1
20 TABLET, FILM COATED ORAL 2 TIMES DAILY
Qty: 90 TABLET | Refills: 1 | Status: SHIPPED | OUTPATIENT
Start: 2024-08-19

## 2024-08-19 RX ORDER — BISOPROLOL FUMARATE AND HYDROCHLOROTHIAZIDE 10; 6.25 MG/1; MG/1
1 TABLET ORAL DAILY
Qty: 90 TABLET | Refills: 1 | Status: SHIPPED | OUTPATIENT
Start: 2024-08-19

## 2024-08-19 RX ORDER — LOVASTATIN 10 MG/1
10 TABLET ORAL NIGHTLY
Qty: 90 TABLET | Refills: 1 | Status: SHIPPED | OUTPATIENT
Start: 2024-08-19

## 2024-08-19 RX ORDER — POTASSIUM CHLORIDE 20 MEQ/1
20 TABLET, EXTENDED RELEASE ORAL DAILY
Qty: 90 TABLET | Refills: 1 | Status: SHIPPED | OUTPATIENT
Start: 2024-08-19

## 2024-08-19 NOTE — PROGRESS NOTES
"Chief Complaint   Patient presents with    Follow-up    Hypertension    Hyperlipidemia    chronic pain syndrome        Subjective      Sherri Elkins is a 64 y.o. who presents for chronic care visit.  Patient has hypertension with stage III CKD.  She takes her medicines as prescribed.  Patient is now seeing behavioral health team and moods are stable.  Patient has gained about 7 pounds since being started on Geodon.  Amitriptyline was added recently for sleep.  Patient continues to use diazepam twice per day and occasionally will take her third dose to initiate sleep as well.  Chronic pain is stable and has not been affected by weight gain.    Patient has a new complaint of sensation as if food is having a hard time passing in the mid to lower esophagus when swallowing.  Discomfort will begin within initiation of 2 or 3 bites of food.  At 1 point pain has become so severe that patient gagged herself.  She takes omeprazole daily.  She denies any heartburn symptoms in between meals.    The following portions of the patient's history were reviewed and updated as appropriate: allergies, current medications, past family history, past medical history, past social history, past surgical history, and problem list.    Review of Systems    Objective   Vital Signs:  /60   Pulse 80   Temp 97.1 °F (36.2 °C)   Resp 20   Ht 154.9 cm (60.98\")   Wt 98.5 kg (217 lb 3.2 oz)   SpO2 97%   BMI 41.06 kg/m²               Physical Exam  Vitals reviewed.   Constitutional:       Appearance: Normal appearance.   HENT:      Head: Normocephalic.      Right Ear: External ear normal.      Left Ear: External ear normal.      Nose: Nose normal.      Mouth/Throat:      Mouth: Mucous membranes are moist.      Pharynx: Oropharynx is clear.   Cardiovascular:      Rate and Rhythm: Normal rate and regular rhythm.      Pulses: Normal pulses.      Heart sounds: Normal heart sounds.   Pulmonary:      Effort: Pulmonary effort is normal.      " Breath sounds: Normal breath sounds.   Abdominal:      General: Abdomen is flat.      Palpations: Abdomen is soft.   Musculoskeletal:      Right lower leg: No edema.      Left lower leg: No edema.   Neurological:      Mental Status: She is alert.          Result Review                     Assessment and Plan  Diagnoses and all orders for this visit:    1. Essential hypertension (Primary)  Comments:  Stable.  Continue current medications.  Reassess in 6 months  Orders:  -     bisoprolol-hydrochlorothiazide (ZIAC) 10-6.25 MG per tablet; Take 1 tablet by mouth Daily.  Dispense: 90 tablet; Refill: 1  -     hydroCHLOROthiazide (MICROZIDE) 12.5 MG capsule; Take 1 capsule by mouth Every Morning.  Dispense: 90 capsule; Refill: 1  -     Basic Metabolic Panel    2. Stage 3a chronic kidney disease (CKD)  Comments:  Stable.  Repeat BMP today.  Orders:  -     Basic Metabolic Panel    3. Arthritis of lumbar spine  Comments:  Stable.  Continue Norco 7.5 mg 3 times daily as needed pain    4. Chronic pain syndrome    5. Class 3 obesity  Comments:  Condition is worsening, likely due to medication side effects.  If renal function allows will initiate metformin    6. Esophageal dysphagia  Comments:  Newly identified.  Add Pepcid to GI regimen.  If no improvement will need EGD  Orders:  -     famotidine (Pepcid) 20 MG tablet; Take 1 tablet by mouth 2 (Two) Times a Day.  Dispense: 90 tablet; Refill: 1    7. Gastroesophageal reflux disease without esophagitis  -     omeprazole (priLOSEC) 40 MG capsule; Take 1 capsule by mouth Daily.  Dispense: 90 capsule; Refill: 1  -     famotidine (Pepcid) 20 MG tablet; Take 1 tablet by mouth 2 (Two) Times a Day.  Dispense: 90 tablet; Refill: 1    8. Lower extremity edema  Comments:  Reduce furosemide to once daily due to resolution of edema.  Consider discontinuation at 6-month follow-up  Orders:  -     furosemide (LASIX) 40 MG tablet; Take 1 tablet by mouth Daily.  Dispense: 90 tablet; Refill: 1    9.  Long term current use of diuretic  -     potassium chloride (KLOR-CON M20) 20 MEQ CR tablet; Take 1 tablet by mouth Daily.  Dispense: 90 tablet; Refill: 1    10. Generalized anxiety disorder  -     diazePAM (VALIUM) 10 MG tablet; Take 1 tablet by mouth 3 (Three) Times a Day As Needed for Anxiety.  Dispense: 90 tablet; Refill: 5    11. Hypercholesterolemia  Comments:  Stable.  Continue statin therapy.  Orders:  -     lovastatin (MEVACOR) 10 MG tablet; Take 1 tablet by mouth Every Night.  Dispense: 90 tablet; Refill: 1            Follow Up  Return in about 6 months (around 2/19/2025) for Next scheduled follow up Chronic Care.  Patient was given instructions and counseling regarding her condition or for health maintenance advice. Please see specific information pulled into the AVS if appropriate.

## 2024-08-20 DIAGNOSIS — N18.31 STAGE 3A CHRONIC KIDNEY DISEASE (CKD): Primary | ICD-10-CM

## 2024-08-20 DIAGNOSIS — E87.6 HYPOKALEMIA: ICD-10-CM

## 2024-08-20 LAB
BUN SERPL-MCNC: 12 MG/DL (ref 8–27)
BUN/CREAT SERPL: 8 (ref 12–28)
CALCIUM SERPL-MCNC: 10 MG/DL (ref 8.7–10.3)
CHLORIDE SERPL-SCNC: 96 MMOL/L (ref 96–106)
CO2 SERPL-SCNC: 29 MMOL/L (ref 20–29)
CREAT SERPL-MCNC: 1.48 MG/DL (ref 0.57–1)
EGFRCR SERPLBLD CKD-EPI 2021: 40 ML/MIN/1.73
GLUCOSE SERPL-MCNC: 105 MG/DL (ref 70–99)
POTASSIUM SERPL-SCNC: 3.2 MMOL/L (ref 3.5–5.2)
SODIUM SERPL-SCNC: 141 MMOL/L (ref 134–144)

## 2024-09-03 ENCOUNTER — HOSPITAL ENCOUNTER (OUTPATIENT)
Dept: HOSPITAL 22 - LAB | Age: 64
Discharge: HOME | End: 2024-09-03
Payer: COMMERCIAL

## 2024-09-03 DIAGNOSIS — N18.31: Primary | ICD-10-CM

## 2024-09-03 DIAGNOSIS — E87.6: ICD-10-CM

## 2024-09-03 LAB
ANION GAP SERPL CALC-SCNC: 9.5 MEQ/L (ref 5–15)
BUN SERPL-MCNC: 13 MG/DL (ref 7–17)
CALCIUM SPEC-MCNC: 9.5 MG/DL (ref 8.4–10.2)
CHLORIDE SPEC-SCNC: 102 MMOL/L (ref 98–107)
CO2 SERPL-SCNC: 31 MMOL/L (ref 22–30)
CREAT BLD-SCNC: 1.1 MG/DL (ref 0.52–1.04)
ESTIMATED GLOMERULAR FILT RATE: 50 ML/MIN (ref 60–?)
GFR (AFRICAN AMERICAN): 61 ML/MIN (ref 60–?)
GLUCOSE: 207 MG/DL (ref 74–100)
POTASSIUM: 3.5 MMOL/L (ref 3.5–5.1)
SODIUM SPEC-SCNC: 139 MMOL/L (ref 136–145)

## 2024-09-03 PROCEDURE — 80048 BASIC METABOLIC PNL TOTAL CA: CPT

## 2024-09-03 PROCEDURE — 36415 COLL VENOUS BLD VENIPUNCTURE: CPT

## 2024-09-05 DIAGNOSIS — M47.816 ARTHRITIS OF LUMBAR SPINE: Chronic | ICD-10-CM

## 2024-09-05 DIAGNOSIS — M48.02 FORAMINAL STENOSIS OF CERVICAL REGION: ICD-10-CM

## 2024-09-05 DIAGNOSIS — G89.4 CHRONIC PAIN SYNDROME: ICD-10-CM

## 2024-09-05 RX ORDER — HYDROCODONE BITARTRATE AND ACETAMINOPHEN 5; 325 MG/1; MG/1
1.5 TABLET ORAL EVERY 8 HOURS PRN
Qty: 135 TABLET | Refills: 0 | Status: SHIPPED | OUTPATIENT
Start: 2024-09-05

## 2024-10-03 DIAGNOSIS — M47.816 ARTHRITIS OF LUMBAR SPINE: Chronic | ICD-10-CM

## 2024-10-03 DIAGNOSIS — G89.4 CHRONIC PAIN SYNDROME: ICD-10-CM

## 2024-10-03 DIAGNOSIS — M48.02 FORAMINAL STENOSIS OF CERVICAL REGION: ICD-10-CM

## 2024-10-03 RX ORDER — CYCLOBENZAPRINE HCL 5 MG
5 TABLET ORAL 3 TIMES DAILY PRN
Qty: 270 TABLET | Refills: 1 | Status: SHIPPED | OUTPATIENT
Start: 2024-10-03

## 2024-10-03 RX ORDER — HYDROCODONE BITARTRATE AND ACETAMINOPHEN 5; 325 MG/1; MG/1
1.5 TABLET ORAL EVERY 8 HOURS PRN
Qty: 135 TABLET | Refills: 0 | Status: SHIPPED | OUTPATIENT
Start: 2024-10-03

## 2024-10-16 DIAGNOSIS — F41.1 GENERALIZED ANXIETY DISORDER: ICD-10-CM

## 2024-10-17 RX ORDER — DIAZEPAM 10 MG
10 TABLET ORAL 3 TIMES DAILY PRN
Qty: 90 TABLET | Refills: 5 | Status: SHIPPED | OUTPATIENT
Start: 2024-10-17

## 2024-11-01 ENCOUNTER — OFFICE VISIT (OUTPATIENT)
Dept: BEHAVIORAL HEALTH | Facility: CLINIC | Age: 64
End: 2024-11-01
Payer: OTHER GOVERNMENT

## 2024-11-01 VITALS
HEIGHT: 61 IN | WEIGHT: 217 LBS | BODY MASS INDEX: 40.97 KG/M2 | DIASTOLIC BLOOD PRESSURE: 74 MMHG | SYSTOLIC BLOOD PRESSURE: 118 MMHG

## 2024-11-01 DIAGNOSIS — G47.00 INSOMNIA, UNSPECIFIED TYPE: ICD-10-CM

## 2024-11-01 DIAGNOSIS — F31.61 BIPOLAR 1 DISORDER, MIXED, MILD: ICD-10-CM

## 2024-11-01 RX ORDER — ZIPRASIDONE HYDROCHLORIDE 60 MG/1
60 CAPSULE ORAL 2 TIMES DAILY WITH MEALS
Qty: 60 CAPSULE | Refills: 2 | Status: SHIPPED | OUTPATIENT
Start: 2024-11-01 | End: 2024-11-01

## 2024-11-01 RX ORDER — ZIPRASIDONE HYDROCHLORIDE 60 MG/1
60 CAPSULE ORAL 2 TIMES DAILY WITH MEALS
Qty: 60 CAPSULE | Refills: 2 | Status: SHIPPED | OUTPATIENT
Start: 2024-11-01

## 2024-11-01 NOTE — PROGRESS NOTES
"     Office  Follow Up Visit      Patient Name: Sherri Elkins  : 1960   MRN: [id number]     Referring Provider: Malik Reddy MD    Chief Complaint: Medication follow-up    ICD-10-CM ICD-9-CM   1. Bipolar 1 disorder, mixed, mild  F31.61 296.61   2. Insomnia, unspecified type  G47.00 780.52        History of Present Illness:   Sherri Elkins is a 64 y.o. female who is here today for follow up related to medication management of bipolar 1 disorder and insomnia.  Current medication regimen include ziprasidone 40 mg twice daily and amitriptyline 25 mg nightly.  Diazepam 10 mg 3 times daily as prescribed by PCP.    Patient reports that there has been some improvement since her last follow-up.  She reports that she is sleeping a lot better after initiating amitriptyline.  She reports some mornings she wakes up feeling a little bit groggy but is short-lived.  Overall she is very satisfied with her sleep improvement in sleep quality.    She reports that she has seen some changes her appetite and some decreased weight after stopping quetiapine and initiating ziprasidone.  She reports that she has some weeks in which she feels more \"down\" and some periods when she feels like she is in a better mood lasting for several weeks.  She reports some continued mood fluctuations.  And periods of increased energy and hyperactivity.  Reports no adverse effect since initiating ziprasidone.      Subjective      Review of Systems:   Review of Systems   Constitutional: Negative.    Respiratory: Negative.     Cardiovascular: Negative.    Gastrointestinal: Negative.    Genitourinary: Negative.    Musculoskeletal: Negative.    Neurological: Negative.    Psychiatric/Behavioral:  Positive for dysphoric mood. The patient is hyperactive.        Patient History:   The following portions of the patient's history were reviewed and updated as appropriate: allergies, current medications, past family history, past medical history, past " "social history, past surgical history and problem list.     Social:  No change in interval history.    Medications:     Current Outpatient Medications:     amitriptyline (ELAVIL) 25 MG tablet, Take 1 tablet by mouth Every Night., Disp: 90 tablet, Rfl: 1    ziprasidone (GEODON) 60 MG capsule, Take 1 capsule by mouth 2 (Two) Times a Day With Meals., Disp: 60 capsule, Rfl: 2    bisoprolol-hydrochlorothiazide (ZIAC) 10-6.25 MG per tablet, Take 1 tablet by mouth Daily., Disp: 90 tablet, Rfl: 1    cyclobenzaprine (FLEXERIL) 5 MG tablet, Take 1 tablet by mouth 3 (Three) Times a Day As Needed for Muscle Spasms., Disp: 270 tablet, Rfl: 1    diazePAM (VALIUM) 10 MG tablet, Take 1 tablet by mouth 3 (Three) Times a Day As Needed for Anxiety., Disp: 90 tablet, Rfl: 5    empagliflozin (Jardiance) 10 MG tablet tablet, Take 1 tablet by mouth Daily., Disp: 90 tablet, Rfl: 0    famotidine (Pepcid) 20 MG tablet, Take 1 tablet by mouth 2 (Two) Times a Day., Disp: 90 tablet, Rfl: 1    furosemide (LASIX) 40 MG tablet, Take 1 tablet by mouth Daily., Disp: 90 tablet, Rfl: 1    hydroCHLOROthiazide (MICROZIDE) 12.5 MG capsule, Take 1 capsule by mouth Every Morning., Disp: 90 capsule, Rfl: 1    HYDROcodone-acetaminophen (Norco) 5-325 MG per tablet, Take 1.5 tablets by mouth Every 8 (Eight) Hours As Needed for Severe Pain., Disp: 135 tablet, Rfl: 0    lovastatin (MEVACOR) 10 MG tablet, Take 1 tablet by mouth Every Night., Disp: 90 tablet, Rfl: 1    omeprazole (priLOSEC) 40 MG capsule, Take 1 capsule by mouth Daily., Disp: 90 capsule, Rfl: 1    potassium chloride (KLOR-CON M20) 20 MEQ CR tablet, Take 1 tablet by mouth Daily., Disp: 90 tablet, Rfl: 1    Objective     Physical Exam:  Vital Signs:   Vitals:    11/01/24 1104   BP: 118/74   Weight: 98.4 kg (217 lb)   Height: 154.9 cm (61\")     Body mass index is 41 kg/m².     Mental Status Exam:   Hygiene:   good  Cooperation:  Cooperative  Eye Contact:  Good  Psychomotor Behavior:  " Appropriate  Affect:  Appropriate  Mood: normal  Speech:  Normal  Thought Process:  Goal directed  Thought Content:  Mood congruent  Suicidal:  None  Homicidal:  None  Hallucinations:  None  Delusion:  None  Memory:  Intact  Orientation:  Grossly intact  Reliability:  good  Insight:  Good  Judgement:  Fair  Impulse Control:  Fair  Physical/Medical Issues:  Yes several medical comorbidities, see chart      Assessment / Plan      Visit Diagnosis/Orders Placed This Visit:  Diagnoses and all orders for this visit:    1. Bipolar 1 disorder, mixed, mild  -     Discontinue: ziprasidone (GEODON) 60 MG capsule; Take 1 capsule by mouth 2 (Two) Times a Day With Meals.  Dispense: 60 capsule; Refill: 2  -     ziprasidone (GEODON) 60 MG capsule; Take 1 capsule by mouth 2 (Two) Times a Day With Meals.  Dispense: 60 capsule; Refill: 2    2. Insomnia, unspecified type  -     amitriptyline (ELAVIL) 25 MG tablet; Take 1 tablet by mouth Every Night.  Dispense: 90 tablet; Refill: 1         Differential:   N/A    Plan:   Increase ziprasidone to 60 mg twice daily.  Continue amitriptyline 25 mg nightly.    Continue supportive psychotherapy efforts and medications as indicated. Treatment and medication options discussed during today's visit. Patient ackowledged and verbally consented to continue with current treatment plan and was educated on the importance of compliance with treatment and follow-up appointments. Patient seems reasonably able to adhere to treatment plan.      Medication Considerations:  Discussed medication options and treatment plan of prescribed medication(s) as well as the risks, benefits, and potential side effects. Patient is agreeable to call the office with any worsening of symptoms or onset of side effects. Patient is agreeable to call 911 or go to the nearest ER should he/she begin having SI/HI.    Quality Measures:   Current some day smoker less than 3 minutes spent counseling Will try to cut down    I advised  Sherri Elkins of the risks of tobacco use.     Follow Up:   Return in about 2 months (around 1/1/2025) for Recheck.      JONAH Hernández, PMYANEP-BC    Answers submitted by the patient for this visit:  Primary Reason for Visit (Submitted on 11/1/2024)  What is the primary reason for your visit?: Depression  Depression (Submitted on 11/1/2024)  Chief Complaint: Depression  Visit: follow-up  Frequency: occasionally  Severity: moderate  excessive worry: Yes  insomnia: No  irritability: Yes  malaise/fatigue: Yes  obsessions: No  hypersomnia: Yes  difficulty controlling mood: Yes  Aggravated by: family issues, social activities

## 2024-11-04 DIAGNOSIS — M47.816 ARTHRITIS OF LUMBAR SPINE: Chronic | ICD-10-CM

## 2024-11-04 DIAGNOSIS — M48.02 FORAMINAL STENOSIS OF CERVICAL REGION: ICD-10-CM

## 2024-11-04 DIAGNOSIS — G89.4 CHRONIC PAIN SYNDROME: ICD-10-CM

## 2024-11-04 RX ORDER — HYDROCODONE BITARTRATE AND ACETAMINOPHEN 5; 325 MG/1; MG/1
1.5 TABLET ORAL EVERY 8 HOURS PRN
Qty: 135 TABLET | Refills: 0 | Status: SHIPPED | OUTPATIENT
Start: 2024-11-04

## 2024-11-21 DIAGNOSIS — R60.0 LOWER EXTREMITY EDEMA: ICD-10-CM

## 2024-11-22 RX ORDER — FUROSEMIDE 40 MG/1
40 TABLET ORAL 2 TIMES DAILY
Qty: 60 TABLET | Refills: 2 | Status: SHIPPED | OUTPATIENT
Start: 2024-11-22

## 2024-11-25 DIAGNOSIS — K21.9 GASTROESOPHAGEAL REFLUX DISEASE WITHOUT ESOPHAGITIS: ICD-10-CM

## 2024-11-25 RX ORDER — OMEPRAZOLE 40 MG/1
40 CAPSULE, DELAYED RELEASE ORAL DAILY
Qty: 30 CAPSULE | Refills: 4 | Status: SHIPPED | OUTPATIENT
Start: 2024-11-25

## 2024-12-02 DIAGNOSIS — K21.9 GASTROESOPHAGEAL REFLUX DISEASE WITHOUT ESOPHAGITIS: ICD-10-CM

## 2024-12-02 DIAGNOSIS — R13.19 ESOPHAGEAL DYSPHAGIA: ICD-10-CM

## 2024-12-02 RX ORDER — FAMOTIDINE 20 MG/1
20 TABLET, FILM COATED ORAL 2 TIMES DAILY
Qty: 180 TABLET | Refills: 1 | Status: SHIPPED | OUTPATIENT
Start: 2024-12-02

## 2024-12-03 DIAGNOSIS — G89.4 CHRONIC PAIN SYNDROME: ICD-10-CM

## 2024-12-03 DIAGNOSIS — M47.816 ARTHRITIS OF LUMBAR SPINE: Chronic | ICD-10-CM

## 2024-12-03 DIAGNOSIS — M48.02 FORAMINAL STENOSIS OF CERVICAL REGION: ICD-10-CM

## 2024-12-04 RX ORDER — HYDROCODONE BITARTRATE AND ACETAMINOPHEN 5; 325 MG/1; MG/1
1.5 TABLET ORAL EVERY 8 HOURS PRN
Qty: 135 TABLET | Refills: 0 | Status: SHIPPED | OUTPATIENT
Start: 2024-12-04

## 2024-12-19 RX ORDER — EMPAGLIFLOZIN 10 MG/1
10 TABLET, FILM COATED ORAL DAILY
Qty: 90 TABLET | Refills: 3 | Status: SHIPPED | OUTPATIENT
Start: 2024-12-19

## 2025-01-02 DIAGNOSIS — M47.816 ARTHRITIS OF LUMBAR SPINE: Chronic | ICD-10-CM

## 2025-01-02 DIAGNOSIS — G89.4 CHRONIC PAIN SYNDROME: ICD-10-CM

## 2025-01-02 DIAGNOSIS — M48.02 FORAMINAL STENOSIS OF CERVICAL REGION: ICD-10-CM

## 2025-01-02 NOTE — TELEPHONE ENCOUNTER
Rx Refill Note  Requested Prescriptions     Pending Prescriptions Disp Refills    HYDROcodone-acetaminophen (Norco) 5-325 MG per tablet 135 tablet 0     Sig: Take 1.5 tablets by mouth Every 8 (Eight) Hours As Needed for Severe Pain.      Last office visit with prescribing clinician: 8/19/2024   Last telemedicine visit with prescribing clinician: Visit date not found   Next office visit with prescribing clinician: 2/20/2025                         Would you like a call back once the refill request has been completed: [] Yes [] No    If the office needs to give you a call back, can they leave a voicemail: [] Yes [] No    Stephanie Hobson MA  01/02/25, 18:10 EST

## 2025-01-03 ENCOUNTER — OFFICE VISIT (OUTPATIENT)
Dept: BEHAVIORAL HEALTH | Facility: CLINIC | Age: 65
End: 2025-01-03
Payer: OTHER GOVERNMENT

## 2025-01-03 VITALS — WEIGHT: 217 LBS | BODY MASS INDEX: 40.97 KG/M2 | HEIGHT: 61 IN

## 2025-01-03 DIAGNOSIS — G47.00 INSOMNIA, UNSPECIFIED TYPE: ICD-10-CM

## 2025-01-03 DIAGNOSIS — F31.31 BIPOLAR 1 DISORDER, DEPRESSED, MILD: Primary | ICD-10-CM

## 2025-01-03 RX ORDER — ZIPRASIDONE HYDROCHLORIDE 60 MG/1
60 CAPSULE ORAL 2 TIMES DAILY WITH MEALS
Qty: 60 CAPSULE | Refills: 2 | Status: SHIPPED | OUTPATIENT
Start: 2025-01-03

## 2025-01-03 RX ORDER — HYDROCODONE BITARTRATE AND ACETAMINOPHEN 5; 325 MG/1; MG/1
1.5 TABLET ORAL EVERY 8 HOURS PRN
Qty: 135 TABLET | Refills: 0 | Status: SHIPPED | OUTPATIENT
Start: 2025-01-03

## 2025-01-03 NOTE — PROGRESS NOTES
Office  Follow Up Visit      Patient Name: Sherri Elkins  : 1960   MRN: [id number]     Referring Provider: Malik Reddy MD    Chief Complaint: Medication follow-up    ICD-10-CM ICD-9-CM   1. Bipolar 1 disorder, depressed, mild  F31.31 296.51   2. Insomnia, unspecified type  G47.00 780.52        History of Present Illness:   Sherri Elkins is a 64 y.o. female who is here today for follow up related to medication management of bipolar 1 disorder and insomnia.  Medication regimen includes ziprasidone 60 mg twice daily, and amitriptyline 25 mg nightly.    Patient reports that medication wise she feels things have been going well.  However, she has been dealing with some significant situational stress.  She reports that her 's brother, whom lived with him for many years, is now in hospice.  She reports has been very difficult for her  and her.  She reports that she has been going to see him daily and finds it difficult to see him knowing that he is dying soon.  She also reports that her granddaughter, who she raised, told her that she no longer likes boys and is a lesbian.  She reports that this was difficult for her to hear, considering she raised her Christianity.  Patient reports other than some the situational stressors that she does have some days where she feels happiness and has energy.  She reports continued sleep satisfaction with amitriptyline use.  No reported side effects with ziprasidone.  She reports some worry and anxiety related to her situational stress.      Subjective      Review of Systems:   Review of Systems   Constitutional:  Positive for fatigue.   Respiratory: Negative.     Cardiovascular: Negative.    Gastrointestinal: Negative.    Genitourinary: Negative.    Musculoskeletal: Negative.    Neurological: Negative.    Psychiatric/Behavioral: Negative.         Patient History:   The following portions of the patient's history were reviewed and updated as appropriate:  "allergies, current medications, past family history, past medical history, past social history, past surgical history and problem list.     Social:  No change in interval history.     Medications:     Current Outpatient Medications:     amitriptyline (ELAVIL) 25 MG tablet, Take 1 tablet by mouth Every Night., Disp: 90 tablet, Rfl: 1    ziprasidone (GEODON) 60 MG capsule, Take 1 capsule by mouth 2 (Two) Times a Day With Meals., Disp: 60 capsule, Rfl: 2    bisoprolol-hydrochlorothiazide (ZIAC) 10-6.25 MG per tablet, Take 1 tablet by mouth Daily., Disp: 90 tablet, Rfl: 1    cyclobenzaprine (FLEXERIL) 5 MG tablet, Take 1 tablet by mouth 3 (Three) Times a Day As Needed for Muscle Spasms., Disp: 270 tablet, Rfl: 1    diazePAM (VALIUM) 10 MG tablet, Take 1 tablet by mouth 3 (Three) Times a Day As Needed for Anxiety., Disp: 90 tablet, Rfl: 5    famotidine (PEPCID) 20 MG tablet, TAKE 1 TABLET BY MOUTH TWICE DAILY, Disp: 180 tablet, Rfl: 1    furosemide (LASIX) 40 MG tablet, TAKE 1 TABLET BY MOUTH TWICE DAILY, Disp: 60 tablet, Rfl: 2    hydroCHLOROthiazide (MICROZIDE) 12.5 MG capsule, Take 1 capsule by mouth Every Morning., Disp: 90 capsule, Rfl: 1    HYDROcodone-acetaminophen (Norco) 5-325 MG per tablet, Take 1.5 tablets by mouth Every 8 (Eight) Hours As Needed for Severe Pain., Disp: 135 tablet, Rfl: 0    Jardiance 10 MG tablet tablet, TAKE 1 TABLET DAILY, Disp: 90 tablet, Rfl: 3    lovastatin (MEVACOR) 10 MG tablet, Take 1 tablet by mouth Every Night., Disp: 90 tablet, Rfl: 1    omeprazole (priLOSEC) 40 MG capsule, TAKE 1 CAPSULE BY MOUTH DAILY, Disp: 30 capsule, Rfl: 4    potassium chloride (KLOR-CON M20) 20 MEQ CR tablet, Take 1 tablet by mouth Daily., Disp: 90 tablet, Rfl: 1    Objective     Physical Exam:  Vital Signs:   Vitals:    01/03/25 1030   Weight: 98.4 kg (217 lb)   Height: 154.9 cm (61\")     Body mass index is 41 kg/m².     Mental Status Exam:   Hygiene:   good  Cooperation:  Cooperative  Eye Contact:  " Good  Psychomotor Behavior:  Appropriate  Affect:  Appropriate  Mood: euthymic  Speech:  Monotone  Thought Process:  Circum  Thought Content:  Mood congruent  Suicidal:  None  Homicidal:  None  Hallucinations:  None  Delusion:  None  Memory:  Intact  Orientation:  Grossly intact  Reliability:  good  Insight:  Good  Judgement:  Good  Impulse Control:  Good  Physical/Medical Issues:  Yes several medical comorbidities, see chart      Assessment / Plan      Visit Diagnosis/Orders Placed This Visit:  Diagnoses and all orders for this visit:    1. Bipolar 1 disorder, depressed, mild (Primary)  -     ziprasidone (GEODON) 60 MG capsule; Take 1 capsule by mouth 2 (Two) Times a Day With Meals.  Dispense: 60 capsule; Refill: 2    2. Insomnia, unspecified type  -     amitriptyline (ELAVIL) 25 MG tablet; Take 1 tablet by mouth Every Night.  Dispense: 90 tablet; Refill: 1         Differential:   Grief    Plan:   Continue current medication regimen.  Discussed grief process and emotional regulation.    Continue supportive psychotherapy efforts and medications as indicated. Treatment and medication options discussed during today's visit. Patient ackowledged and verbally consented to continue with current treatment plan and was educated on the importance of compliance with treatment and follow-up appointments. Patient seems reasonably able to adhere to treatment plan.      Medication Considerations:  Discussed medication options and treatment plan of prescribed medication(s) as well as the risks, benefits, and potential side effects. Patient is agreeable to call the office with any worsening of symptoms or onset of side effects. Patient is agreeable to call 911 or go to the nearest ER should he/she begin having SI/HI.    Quality Measures:   Light Smoker less than 3 minutes spent counseling Will try to cut down    I advised Sherri Elkins of the risks of tobacco use.     Follow Up:   Return in about 2 months (around 3/3/2025) for  Recheck.      JONAH Hernández, PMHNP-BC    Answers submitted by the patient for this visit:  Primary Reason for Visit (Submitted on 1/2/2025)  What is the primary reason for your visit?: Problem Not Listed  Problem not listed (Submitted on 1/2/2025)  Chief Complaint: Other medical problem  Reason for appointment: Medicine check  anorexia: No  joint pain: Yes  change in stool: No  joint swelling: No  swollen glands: No  vertigo: No  visual change: No  Onset: 1 to 6 months  Chronicity: recurrent  Frequency: weekly

## 2025-02-03 DIAGNOSIS — G89.4 CHRONIC PAIN SYNDROME: ICD-10-CM

## 2025-02-03 DIAGNOSIS — M47.816 ARTHRITIS OF LUMBAR SPINE: Chronic | ICD-10-CM

## 2025-02-03 DIAGNOSIS — M48.02 FORAMINAL STENOSIS OF CERVICAL REGION: ICD-10-CM

## 2025-02-03 RX ORDER — HYDROCODONE BITARTRATE AND ACETAMINOPHEN 5; 325 MG/1; MG/1
1.5 TABLET ORAL EVERY 8 HOURS PRN
Qty: 135 TABLET | Refills: 0 | Status: SHIPPED | OUTPATIENT
Start: 2025-02-03

## 2025-02-17 DIAGNOSIS — I10 ESSENTIAL HYPERTENSION: ICD-10-CM

## 2025-02-17 RX ORDER — BISOPROLOL FUMARATE AND HYDROCHLOROTHIAZIDE 10; 6.25 MG/1; MG/1
1 TABLET ORAL DAILY
Qty: 90 TABLET | Refills: 0 | Status: SHIPPED | OUTPATIENT
Start: 2025-02-17

## 2025-02-26 DIAGNOSIS — Z79.899 LONG TERM CURRENT USE OF DIURETIC: ICD-10-CM

## 2025-02-26 RX ORDER — POTASSIUM CHLORIDE 1500 MG/1
20 TABLET, EXTENDED RELEASE ORAL DAILY
Qty: 90 TABLET | Refills: 1 | Status: SHIPPED | OUTPATIENT
Start: 2025-02-26 | End: 2025-02-27 | Stop reason: SDUPTHER

## 2025-02-27 ENCOUNTER — OFFICE VISIT (OUTPATIENT)
Dept: FAMILY MEDICINE CLINIC | Facility: CLINIC | Age: 65
End: 2025-02-27
Payer: OTHER GOVERNMENT

## 2025-02-27 VITALS
OXYGEN SATURATION: 97 % | HEART RATE: 64 BPM | TEMPERATURE: 98.1 F | WEIGHT: 208.2 LBS | RESPIRATION RATE: 20 BRPM | BODY MASS INDEX: 39.31 KG/M2 | SYSTOLIC BLOOD PRESSURE: 110 MMHG | DIASTOLIC BLOOD PRESSURE: 75 MMHG | HEIGHT: 61 IN

## 2025-02-27 DIAGNOSIS — E78.00 HYPERCHOLESTEROLEMIA: ICD-10-CM

## 2025-02-27 DIAGNOSIS — R13.19 ESOPHAGEAL DYSPHAGIA: ICD-10-CM

## 2025-02-27 DIAGNOSIS — N18.31 STAGE 3A CHRONIC KIDNEY DISEASE (CKD): ICD-10-CM

## 2025-02-27 DIAGNOSIS — R60.0 LOWER EXTREMITY EDEMA: ICD-10-CM

## 2025-02-27 DIAGNOSIS — Z79.891 LONG TERM (CURRENT) USE OF OPIATE ANALGESIC: ICD-10-CM

## 2025-02-27 DIAGNOSIS — M47.816 ARTHRITIS OF LUMBAR SPINE: Chronic | ICD-10-CM

## 2025-02-27 DIAGNOSIS — M48.02 FORAMINAL STENOSIS OF CERVICAL REGION: Chronic | ICD-10-CM

## 2025-02-27 DIAGNOSIS — K21.9 GASTROESOPHAGEAL REFLUX DISEASE WITHOUT ESOPHAGITIS: ICD-10-CM

## 2025-02-27 DIAGNOSIS — G89.4 CHRONIC PAIN SYNDROME: ICD-10-CM

## 2025-02-27 DIAGNOSIS — Z79.899 LONG TERM CURRENT USE OF DIURETIC: ICD-10-CM

## 2025-02-27 DIAGNOSIS — I10 ESSENTIAL HYPERTENSION: Primary | ICD-10-CM

## 2025-02-27 LAB
EXPIRATION DATE: ABNORMAL
Lab: ABNORMAL
POC ALBUMIN, URINE: 30 MG/L
POC CREATININE, URINE: 50 MG/DL
POC URINE ALB/CREA RATIO: ABNORMAL

## 2025-02-27 RX ORDER — POTASSIUM CHLORIDE 1500 MG/1
20 TABLET, EXTENDED RELEASE ORAL DAILY
Qty: 90 TABLET | Refills: 1 | Status: SHIPPED | OUTPATIENT
Start: 2025-02-27

## 2025-02-27 RX ORDER — HYDROCHLOROTHIAZIDE 12.5 MG/1
12.5 CAPSULE ORAL EVERY MORNING
Qty: 90 CAPSULE | Refills: 1 | Status: SHIPPED | OUTPATIENT
Start: 2025-02-27

## 2025-02-27 RX ORDER — LOVASTATIN 10 MG/1
10 TABLET ORAL NIGHTLY
Qty: 90 TABLET | Refills: 1 | Status: SHIPPED | OUTPATIENT
Start: 2025-02-27 | End: 2025-03-03

## 2025-02-27 RX ORDER — FUROSEMIDE 40 MG/1
40 TABLET ORAL 2 TIMES DAILY
Qty: 60 TABLET | Refills: 5 | Status: SHIPPED | OUTPATIENT
Start: 2025-02-27

## 2025-02-27 RX ORDER — BISOPROLOL FUMARATE AND HYDROCHLOROTHIAZIDE 10; 6.25 MG/1; MG/1
1 TABLET ORAL DAILY
Qty: 90 TABLET | Refills: 1 | Status: SHIPPED | OUTPATIENT
Start: 2025-02-27

## 2025-02-27 RX ORDER — OMEPRAZOLE 40 MG/1
40 CAPSULE, DELAYED RELEASE ORAL DAILY
Qty: 30 CAPSULE | Refills: 4 | Status: SHIPPED | OUTPATIENT
Start: 2025-02-27

## 2025-02-27 RX ORDER — FAMOTIDINE 20 MG/1
20 TABLET, FILM COATED ORAL 2 TIMES DAILY
Qty: 180 TABLET | Refills: 1 | Status: SHIPPED | OUTPATIENT
Start: 2025-02-27

## 2025-02-27 NOTE — PROGRESS NOTES
"Chief Complaint   Patient presents with    Follow-up    Hypertension       Subjective      Sherri Elkins is a 64 y.o. who presents for chronic care for hypertension with CKD stage 3a.  She denies changes in physical health.  Her brother-in-law passed away approximately a week ago.  Mental health is stable and she has follow-up with behavioral health next week.    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    The following portions of the patient's history were reviewed and updated as appropriate: allergies, current medications, past family history, past medical history, past social history, past surgical history, and problem list.    Review of Systems    Objective   Vital Signs:  /75   Pulse 64   Temp 98.1 °F (36.7 °C)   Resp 20   Ht 154.9 cm (60.98\")   Wt 94.4 kg (208 lb 3.2 oz)   SpO2 97%   BMI 39.36 kg/m²     Class 2 Severe Obesity (BMI >=35 and <=39.9). Obesity-related health conditions include the following: hypertension. Obesity is improving with lifestyle modifications. BMI is is above average; BMI management plan is completed. We discussed portion control and increasing exercise.        Physical Exam  Constitutional:       Appearance: Normal appearance.   HENT:      Head: Normocephalic and atraumatic.      Right Ear: Tympanic membrane and ear canal normal.      Left Ear: Tympanic membrane and ear canal normal.      Nose: Nose normal.      Mouth/Throat:      Mouth: Mucous membranes are moist.      Pharynx: Oropharynx is clear.   Eyes:      Conjunctiva/sclera: Conjunctivae normal.   Cardiovascular:      Rate and Rhythm: Normal rate and regular rhythm.      Heart sounds: Normal heart sounds. No murmur heard.  Pulmonary:      Effort: Pulmonary effort is normal. No respiratory distress.      Breath " sounds: Normal breath sounds.   Musculoskeletal:      Cervical back: Normal range of motion and neck supple. No tenderness.      Right lower leg: No edema.      Left lower leg: No edema.   Lymphadenopathy:      Cervical: No cervical adenopathy.   Skin:     General: Skin is warm and dry.   Neurological:      Mental Status: She is alert.   Psychiatric:         Mood and Affect: Mood normal.          Result Review   The following data was reviewed by: Malik Reddy MD on 02/27/2025:  BMP          4/12/2024    10:25 8/19/2024    10:33   BMP   BUN 14  12    Creatinine 1.23  1.48    Sodium 141  141    Potassium 4.9  3.2    Chloride 102  96    CO2 31.5  29    Calcium 10.0  10.0      Data reviewed : UACR  mg/g               Assessment and Plan  Diagnoses and all orders for this visit:    1. Essential hypertension (Primary)  Comments:  Stable.  Continue current medications.  Reassess in 6 months.  Surveillance labs today  Orders:  -     hydroCHLOROthiazide (MICROZIDE) 12.5 MG capsule; Take 1 capsule by mouth Every Morning.  Dispense: 90 capsule; Refill: 1  -     Comprehensive Metabolic Panel  -     bisoprolol-hydrochlorothiazide (ZIAC) 10-6.25 MG per tablet; Take 1 tablet by mouth Daily.  Dispense: 90 tablet; Refill: 1    2. Stage 3a chronic kidney disease (CKD)  Comments:  Stable.  Surveillance labs ordered.  Continue Jardiance  Orders:  -     Comprehensive Metabolic Panel  -     CBC & Differential  -     Albumin/Creatinine Ratio Urine    3. Gastroesophageal reflux disease without esophagitis  Comments:  Stable.  Continue PPI and H2 blocker  Orders:  -     omeprazole (priLOSEC) 40 MG capsule; Take 1 capsule by mouth Daily.  Dispense: 30 capsule; Refill: 4  -     famotidine (PEPCID) 20 MG tablet; Take 1 tablet by mouth 2 (Two) Times a Day.  Dispense: 180 tablet; Refill: 1    4. Lower extremity edema  Comments:  Continue as needed furosemide  Orders:  -     furosemide (LASIX) 40 MG tablet; Take 1 tablet by mouth  2 (Two) Times a Day.  Dispense: 60 tablet; Refill: 5    5. Foraminal stenosis of cervical region  -     POC Medline 12 Panel Urine Drug Screen    6. Arthritis of lumbar spine  -     POC Medline 12 Panel Urine Drug Screen    7. Hypercholesterolemia  -     Lipid Panel  -     POC Medline 12 Panel Urine Drug Screen  -     lovastatin (MEVACOR) 10 MG tablet; Take 1 tablet by mouth Every Night.  Dispense: 90 tablet; Refill: 1    8. Chronic pain syndrome  Comments:  Pain is stable.  Continue Norco 7.5 mg 3 times daily as needed.  Avoid NSAIDs due to renal disease    9. Long term (current) use of opiate analgesic  -     POC Medline 12 Panel Urine Drug Screen    10. Esophageal dysphagia  -     famotidine (PEPCID) 20 MG tablet; Take 1 tablet by mouth 2 (Two) Times a Day.  Dispense: 180 tablet; Refill: 1    11. Long term current use of diuretic  -     potassium chloride (KLOR-CON M20) 20 MEQ CR tablet; Take 1 tablet by mouth Daily.  Dispense: 90 tablet; Refill: 1            Follow Up  Return in about 6 months (around 8/27/2025) for Next scheduled follow up Chronic Care.  Patient was given instructions and counseling regarding her condition or for health maintenance advice. Please see specific information pulled into the AVS if appropriate.

## 2025-02-28 LAB
ALBUMIN SERPL-MCNC: 4.5 G/DL (ref 3.9–4.9)
ALP SERPL-CCNC: 71 IU/L (ref 44–121)
ALT SERPL-CCNC: 13 IU/L (ref 0–32)
AST SERPL-CCNC: 19 IU/L (ref 0–40)
BASOPHILS # BLD AUTO: 0 X10E3/UL (ref 0–0.2)
BASOPHILS NFR BLD AUTO: 0 %
BILIRUB SERPL-MCNC: 0.4 MG/DL (ref 0–1.2)
BUN SERPL-MCNC: 15 MG/DL (ref 8–27)
BUN/CREAT SERPL: 13 (ref 12–28)
CALCIUM SERPL-MCNC: 10 MG/DL (ref 8.7–10.3)
CHLORIDE SERPL-SCNC: 99 MMOL/L (ref 96–106)
CHOLEST SERPL-MCNC: 188 MG/DL (ref 100–199)
CO2 SERPL-SCNC: 27 MMOL/L (ref 20–29)
CREAT SERPL-MCNC: 1.16 MG/DL (ref 0.57–1)
EGFRCR SERPLBLD CKD-EPI 2021: 53 ML/MIN/1.73
EOSINOPHIL # BLD AUTO: 0.1 X10E3/UL (ref 0–0.4)
EOSINOPHIL NFR BLD AUTO: 1 %
ERYTHROCYTE [DISTWIDTH] IN BLOOD BY AUTOMATED COUNT: 13 % (ref 11.7–15.4)
GLOBULIN SER CALC-MCNC: 2.9 G/DL (ref 1.5–4.5)
GLUCOSE SERPL-MCNC: 100 MG/DL (ref 70–99)
HCT VFR BLD AUTO: 46.1 % (ref 34–46.6)
HDLC SERPL-MCNC: 41 MG/DL
HGB BLD-MCNC: 14.9 G/DL (ref 11.1–15.9)
IMM GRANULOCYTES # BLD AUTO: 0 X10E3/UL (ref 0–0.1)
IMM GRANULOCYTES NFR BLD AUTO: 0 %
LDLC SERPL CALC-MCNC: 118 MG/DL (ref 0–99)
LYMPHOCYTES # BLD AUTO: 1.8 X10E3/UL (ref 0.7–3.1)
LYMPHOCYTES NFR BLD AUTO: 32 %
MCH RBC QN AUTO: 29.3 PG (ref 26.6–33)
MCHC RBC AUTO-ENTMCNC: 32.3 G/DL (ref 31.5–35.7)
MCV RBC AUTO: 91 FL (ref 79–97)
MONOCYTES # BLD AUTO: 0.4 X10E3/UL (ref 0.1–0.9)
MONOCYTES NFR BLD AUTO: 8 %
NEUTROPHILS # BLD AUTO: 3.4 X10E3/UL (ref 1.4–7)
NEUTROPHILS NFR BLD AUTO: 59 %
PLATELET # BLD AUTO: 235 X10E3/UL (ref 150–450)
POTASSIUM SERPL-SCNC: 3.7 MMOL/L (ref 3.5–5.2)
PROT SERPL-MCNC: 7.4 G/DL (ref 6–8.5)
RBC # BLD AUTO: 5.09 X10E6/UL (ref 3.77–5.28)
SODIUM SERPL-SCNC: 141 MMOL/L (ref 134–144)
TRIGL SERPL-MCNC: 161 MG/DL (ref 0–149)
VLDLC SERPL CALC-MCNC: 29 MG/DL (ref 5–40)
WBC # BLD AUTO: 5.7 X10E3/UL (ref 3.4–10.8)

## 2025-03-03 DIAGNOSIS — M47.816 ARTHRITIS OF LUMBAR SPINE: Chronic | ICD-10-CM

## 2025-03-03 DIAGNOSIS — M48.02 FORAMINAL STENOSIS OF CERVICAL REGION: ICD-10-CM

## 2025-03-03 DIAGNOSIS — E78.00 HYPERCHOLESTEROLEMIA: ICD-10-CM

## 2025-03-03 DIAGNOSIS — G89.4 CHRONIC PAIN SYNDROME: ICD-10-CM

## 2025-03-03 RX ORDER — LOVASTATIN 10 MG/1
10 TABLET ORAL NIGHTLY
Qty: 90 TABLET | Refills: 1 | Status: SHIPPED | OUTPATIENT
Start: 2025-03-03

## 2025-03-03 RX ORDER — HYDROCODONE BITARTRATE AND ACETAMINOPHEN 5; 325 MG/1; MG/1
1.5 TABLET ORAL EVERY 8 HOURS PRN
Qty: 135 TABLET | Refills: 0 | Status: SHIPPED | OUTPATIENT
Start: 2025-03-03

## 2025-03-05 ENCOUNTER — OFFICE VISIT (OUTPATIENT)
Dept: BEHAVIORAL HEALTH | Facility: CLINIC | Age: 65
End: 2025-03-05
Payer: OTHER GOVERNMENT

## 2025-03-05 VITALS
WEIGHT: 208 LBS | SYSTOLIC BLOOD PRESSURE: 110 MMHG | BODY MASS INDEX: 39.27 KG/M2 | DIASTOLIC BLOOD PRESSURE: 75 MMHG | HEIGHT: 61 IN

## 2025-03-05 DIAGNOSIS — G47.00 INSOMNIA, UNSPECIFIED TYPE: ICD-10-CM

## 2025-03-05 DIAGNOSIS — F31.31 BIPOLAR 1 DISORDER, DEPRESSED, MILD: Primary | ICD-10-CM

## 2025-03-05 RX ORDER — ZIPRASIDONE HYDROCHLORIDE 60 MG/1
60 CAPSULE ORAL 2 TIMES DAILY WITH MEALS
Qty: 60 CAPSULE | Refills: 2 | Status: SHIPPED | OUTPATIENT
Start: 2025-03-05

## 2025-03-05 NOTE — PROGRESS NOTES
Office  Follow Up Visit      Patient Name: Sherri Elkins  : 1960   MRN: [id number]     Referring Provider: Malik Reddy MD    Chief Complaint: Medication follow-up    ICD-10-CM ICD-9-CM   1. Bipolar 1 disorder, depressed, mild  F31.31 296.51   2. Insomnia, unspecified type  G47.00 780.52        History of Present Illness:   Sherri Elkins is a 64 y.o. female who is here today for follow up related to medication management of bipolar 1 disorder and insomnia.  Medication regimen include ziprasidone 60 mg twice daily and amitriptyline 25 mg nightly.    Patient reports that things been little bit difficult over the past several weeks.  Her brother-in-law who lives with her and her  related to health issues had been on hospice care.  She reports that he passed 2 weeks ago.  She reports grief related to the loss which has caused an increase in anxiety and worry.  She reports that the past several days she has seen improvement overall mood and that she feels she is trying to work through the grief process.  She reports that her  seems to be struggling with the loss of his brother.  She reports continued adequate sleep with amitriptyline use.  Overall, feels that her mood is stable other than emotions related to grief.  No side effects reported at this time.      Subjective      Review of Systems:   Review of Systems   Constitutional: Negative.    Respiratory: Negative.     Cardiovascular: Negative.    Gastrointestinal: Negative.    Genitourinary: Negative.    Musculoskeletal: Negative.    Neurological: Negative.    Psychiatric/Behavioral:  The patient is nervous/anxious.        Patient History:   The following portions of the patient's history were reviewed and updated as appropriate: allergies, current medications, past family history, past medical history, past social history, past surgical history and problem list.     Social:  Patient's brother-in-law passed 2 weeks  "ago.    Medications:     Current Outpatient Medications:     amitriptyline (ELAVIL) 25 MG tablet, Take 1 tablet by mouth Every Night., Disp: 90 tablet, Rfl: 1    ziprasidone (GEODON) 60 MG capsule, Take 1 capsule by mouth 2 (Two) Times a Day With Meals., Disp: 60 capsule, Rfl: 2    bisoprolol-hydrochlorothiazide (ZIAC) 10-6.25 MG per tablet, Take 1 tablet by mouth Daily., Disp: 90 tablet, Rfl: 1    cyclobenzaprine (FLEXERIL) 5 MG tablet, Take 1 tablet by mouth 3 (Three) Times a Day As Needed for Muscle Spasms., Disp: 270 tablet, Rfl: 1    diazePAM (VALIUM) 10 MG tablet, Take 1 tablet by mouth 3 (Three) Times a Day As Needed for Anxiety., Disp: 90 tablet, Rfl: 5    famotidine (PEPCID) 20 MG tablet, Take 1 tablet by mouth 2 (Two) Times a Day., Disp: 180 tablet, Rfl: 1    furosemide (LASIX) 40 MG tablet, Take 1 tablet by mouth 2 (Two) Times a Day., Disp: 60 tablet, Rfl: 5    hydroCHLOROthiazide (MICROZIDE) 12.5 MG capsule, Take 1 capsule by mouth Every Morning., Disp: 90 capsule, Rfl: 1    HYDROcodone-acetaminophen (Norco) 5-325 MG per tablet, Take 1.5 tablets by mouth Every 8 (Eight) Hours As Needed for Severe Pain., Disp: 135 tablet, Rfl: 0    Jardiance 10 MG tablet tablet, TAKE 1 TABLET DAILY, Disp: 90 tablet, Rfl: 3    lovastatin (MEVACOR) 10 MG tablet, TAKE 1 TABLET BY MOUTH EVERY NIGHT, Disp: 90 tablet, Rfl: 1    omeprazole (priLOSEC) 40 MG capsule, Take 1 capsule by mouth Daily., Disp: 30 capsule, Rfl: 4    potassium chloride (KLOR-CON M20) 20 MEQ CR tablet, Take 1 tablet by mouth Daily., Disp: 90 tablet, Rfl: 1    Objective     Physical Exam:  Vital Signs:   Vitals:    03/05/25 1108   BP: 110/75   Weight: 94.3 kg (208 lb)   Height: 154.9 cm (61\")     Body mass index is 39.3 kg/m².     Mental Status Exam:   Hygiene:   good  Cooperation:  Cooperative  Eye Contact:  Good  Psychomotor Behavior:  Appropriate  Affect:  Restricted  Mood: sad  Speech:  Normal  Thought Process:  Circum  Thought Content:  Mood " congruent  Suicidal:  None  Homicidal:  None  Hallucinations:  None  Delusion:  None  Memory:  Intact  Orientation:  Grossly intact  Reliability:  good  Insight:  Fair  Judgement:  Fair  Impulse Control:  Fair  Physical/Medical Issues:  No      Assessment / Plan      Visit Diagnosis/Orders Placed This Visit:  Diagnoses and all orders for this visit:    1. Bipolar 1 disorder, depressed, mild (Primary)  -     ziprasidone (GEODON) 60 MG capsule; Take 1 capsule by mouth 2 (Two) Times a Day With Meals.  Dispense: 60 capsule; Refill: 2    2. Insomnia, unspecified type  -     amitriptyline (ELAVIL) 25 MG tablet; Take 1 tablet by mouth Every Night.  Dispense: 90 tablet; Refill: 1         Differential:   Grief    Plan:   Continue with current medication regimen.  Discussed grief process and emotional regulation.  Encouraged open communication with spouse.  Encouraged journaling.    Continue supportive psychotherapy efforts and medications as indicated. Treatment and medication options discussed during today's visit. Patient ackowledged and verbally consented to continue with current treatment plan and was educated on the importance of compliance with treatment and follow-up appointments. Patient seems reasonably able to adhere to treatment plan.      Medication Considerations:  Discussed medication options and treatment plan of prescribed medication(s) as well as the risks, benefits, and potential side effects. Patient is agreeable to call the office with any worsening of symptoms or onset of side effects. Patient is agreeable to call 911 or go to the nearest ER should he/she begin having SI/HI.    Quality Measures:   Current some day smoker less than 3 minutes spent counseling Has reduced [place] use    I advised Sherri Elkins of the risks of tobacco use.     Follow Up:   Return in about 2 months (around 5/5/2025) for Recheck.      JONAH Hernández, PMHNP-BC      *Part of this note may be an electronic  transcription/translation of spoken language to printed text using the Dragon Dictation System.     Answers submitted by the patient for this visit:  Problem not listed (Submitted on 3/3/2025)  Chief Complaint: Other medical problem  Reason for appointment: Medicine check  anorexia: No  joint pain: Yes  change in stool: No  joint swelling: No  swollen glands: No  vertigo: No  visual change: No  Onset: at an unknown time  Chronicity: recurrent  Frequency: daily  Medications tried: Diazepam for anxiety

## 2025-03-31 DIAGNOSIS — M48.02 FORAMINAL STENOSIS OF CERVICAL REGION: ICD-10-CM

## 2025-03-31 DIAGNOSIS — G89.4 CHRONIC PAIN SYNDROME: ICD-10-CM

## 2025-03-31 DIAGNOSIS — M47.816 ARTHRITIS OF LUMBAR SPINE: Chronic | ICD-10-CM

## 2025-04-01 RX ORDER — HYDROCODONE BITARTRATE AND ACETAMINOPHEN 5; 325 MG/1; MG/1
1.5 TABLET ORAL EVERY 8 HOURS PRN
Qty: 135 TABLET | Refills: 0 | Status: SHIPPED | OUTPATIENT
Start: 2025-04-01

## 2025-04-30 DIAGNOSIS — M47.816 ARTHRITIS OF LUMBAR SPINE: Chronic | ICD-10-CM

## 2025-04-30 DIAGNOSIS — G89.4 CHRONIC PAIN SYNDROME: ICD-10-CM

## 2025-04-30 DIAGNOSIS — M48.02 FORAMINAL STENOSIS OF CERVICAL REGION: ICD-10-CM

## 2025-04-30 RX ORDER — HYDROCODONE BITARTRATE AND ACETAMINOPHEN 5; 325 MG/1; MG/1
1.5 TABLET ORAL EVERY 8 HOURS PRN
Qty: 135 TABLET | Refills: 0 | Status: SHIPPED | OUTPATIENT
Start: 2025-04-30

## 2025-05-07 ENCOUNTER — OFFICE VISIT (OUTPATIENT)
Dept: BEHAVIORAL HEALTH | Facility: CLINIC | Age: 65
End: 2025-05-07
Payer: OTHER GOVERNMENT

## 2025-05-07 VITALS — HEIGHT: 61 IN | BODY MASS INDEX: 39.27 KG/M2 | WEIGHT: 208 LBS

## 2025-05-07 DIAGNOSIS — F31.75 BIPOLAR 1 DISORDER, DEPRESSED, PARTIAL REMISSION: ICD-10-CM

## 2025-05-07 DIAGNOSIS — G47.00 INSOMNIA, UNSPECIFIED TYPE: Primary | ICD-10-CM

## 2025-05-07 RX ORDER — ZIPRASIDONE HYDROCHLORIDE 60 MG/1
60 CAPSULE ORAL 2 TIMES DAILY WITH MEALS
Qty: 60 CAPSULE | Refills: 2 | Status: SHIPPED | OUTPATIENT
Start: 2025-05-07

## 2025-05-07 NOTE — PROGRESS NOTES
"     Office  Follow Up Visit      Patient Name: Sherri Elkins  : 1960   MRN: [id number]     Referring Provider: Malik Reddy MD    Chief Complaint: Medication follow-up    ICD-10-CM ICD-9-CM   1. Insomnia, unspecified type  G47.00 780.52   2. Bipolar 1 disorder, depressed, partial remission  F31.75 296.55        History of Present Illness:   Sherri Elkins is a 65 y.o. female who is here today for follow up related to medication management of bipolar disorder and insomnia.  Medication regimen includes ziprasidone 60 mg twice daily, amitriptyline 25 mg nightly, and diazepam 2 mg 3 times daily as prescribed by PCP.    Patient reports that her depression has seemed to stabilize and that her mood is \"good.\"  She reports continued situational anxiety.  She reports that she still struggles when her  is away at work.  She reports that she feels like she has nothing to do and just becomes restless.  She reports that she feels she has become codependent on her .  Stating \"I just feel like I do not do anything unless he is with me.\"  Patient reports that there is a park close to her home that she has been thing about going to daily if the weather is nice to at least get out of her house.  Patient reports that she has not experienced adverse effects with her current medication regimen.  Affirms satisfactory sleep.      Subjective      Review of Systems:   Review of Systems   Constitutional: Negative.    Respiratory: Negative.     Cardiovascular: Negative.    Gastrointestinal: Negative.    Genitourinary: Negative.    Musculoskeletal: Negative.    Neurological: Negative.    Psychiatric/Behavioral:  The patient is nervous/anxious.        Patient History:   The following portions of the patient's history were reviewed and updated as appropriate: allergies, current medications, past family history, past medical history, past social history, past surgical history and problem list.     Social:  No " "change in interval history.    Medications:     Current Outpatient Medications:     ziprasidone (GEODON) 60 MG capsule, Take 1 capsule by mouth 2 (Two) Times a Day With Meals., Disp: 60 capsule, Rfl: 2    amitriptyline (ELAVIL) 25 MG tablet, Take 1 tablet by mouth Every Night., Disp: 90 tablet, Rfl: 1    bisoprolol-hydrochlorothiazide (ZIAC) 10-6.25 MG per tablet, Take 1 tablet by mouth Daily., Disp: 90 tablet, Rfl: 1    cyclobenzaprine (FLEXERIL) 5 MG tablet, Take 1 tablet by mouth 3 (Three) Times a Day As Needed for Muscle Spasms., Disp: 270 tablet, Rfl: 1    diazePAM (VALIUM) 10 MG tablet, Take 1 tablet by mouth 3 (Three) Times a Day As Needed for Anxiety., Disp: 90 tablet, Rfl: 5    famotidine (PEPCID) 20 MG tablet, Take 1 tablet by mouth 2 (Two) Times a Day., Disp: 180 tablet, Rfl: 1    furosemide (LASIX) 40 MG tablet, Take 1 tablet by mouth 2 (Two) Times a Day., Disp: 60 tablet, Rfl: 5    hydroCHLOROthiazide (MICROZIDE) 12.5 MG capsule, Take 1 capsule by mouth Every Morning., Disp: 90 capsule, Rfl: 1    HYDROcodone-acetaminophen (Norco) 5-325 MG per tablet, Take 1.5 tablets by mouth Every 8 (Eight) Hours As Needed for Severe Pain., Disp: 135 tablet, Rfl: 0    Jardiance 10 MG tablet tablet, TAKE 1 TABLET DAILY, Disp: 90 tablet, Rfl: 3    lovastatin (MEVACOR) 10 MG tablet, TAKE 1 TABLET BY MOUTH EVERY NIGHT, Disp: 90 tablet, Rfl: 1    omeprazole (priLOSEC) 40 MG capsule, Take 1 capsule by mouth Daily., Disp: 30 capsule, Rfl: 4    potassium chloride (KLOR-CON M20) 20 MEQ CR tablet, Take 1 tablet by mouth Daily., Disp: 90 tablet, Rfl: 1    Objective     Physical Exam:  Vital Signs:   Vitals:    05/07/25 1035   Weight: 94.3 kg (208 lb)   Height: 154.9 cm (61\")     Body mass index is 39.3 kg/m².     Mental Status Exam:   Hygiene:   good  Cooperation:  Cooperative  Eye Contact:  Good  Psychomotor Behavior:  Appropriate  Affect:  Restricted  Mood: euthymic  Speech:  Normal  Thought Process:  Circum  Thought Content:  " Mood congruent  Suicidal:  None  Homicidal:  None  Hallucinations:  None  Delusion:  None  Memory:  Intact  Orientation:  Grossly intact  Reliability:  good  Insight:  Fair  Judgement:  Fair  Impulse Control:  Fair  Physical/Medical Issues:  No      Assessment / Plan      Visit Diagnosis/Orders Placed This Visit:  Diagnoses and all orders for this visit:    1. Insomnia, unspecified type (Primary)    2. Bipolar 1 disorder, depressed, partial remission  -     ziprasidone (GEODON) 60 MG capsule; Take 1 capsule by mouth 2 (Two) Times a Day With Meals.  Dispense: 60 capsule; Refill: 2         Differential:   N/A    Plan:   Continue with current medication regimen.  Discussed ways patient could engage in activities outside of the home when her  is at work.  Discussed self-care stress management.    Continue supportive psychotherapy efforts and medications as indicated. Treatment and medication options discussed during today's visit. Patient ackowledged and verbally consented to continue with current treatment plan and was educated on the importance of compliance with treatment and follow-up appointments. Patient seems reasonably able to adhere to treatment plan.      Medication Considerations:  Discussed medication options and treatment plan of prescribed medication(s) as well as the risks, benefits, and potential side effects. Patient is agreeable to call the office with any worsening of symptoms or onset of side effects. Patient is agreeable to call 911 or go to the nearest ER should he/she begin having SI/HI.    Quality Measures:   Current some day smoker less than 3 minutes spent counseling Will try to cut down    I advised Sherri Elkins of the risks of tobacco use.     Follow Up:   Return in about 3 months (around 8/7/2025) for Recheck.      JONAH Hernández, PMHNP-BC      *Part of this note may be an electronic transcription/translation of spoken language to printed text using the Dragon Dictation System.      Answers submitted by the patient for this visit:  Anxiety (Submitted on 4/30/2025)  Chief Complaint: Anxiety  Visit: follow-up  Frequency: most days  Severity: severe  depressed mood: Yes  dry mouth: No  excessive worry: Yes  insomnia: No  irritability: Yes  malaise/fatigue: Yes  obsessions: No  Sleep quality: fair  Hours of sleep per night: 6 Hours  Medication compliance: %  Side effects: fatigue, constipation/diarrhea  Additional information: Have had anxiety moods more than usual in april so bad i felt them in my stomach an chest one week

## 2025-06-04 DIAGNOSIS — G89.4 CHRONIC PAIN SYNDROME: ICD-10-CM

## 2025-06-04 DIAGNOSIS — M48.02 FORAMINAL STENOSIS OF CERVICAL REGION: ICD-10-CM

## 2025-06-04 DIAGNOSIS — M47.816 ARTHRITIS OF LUMBAR SPINE: Chronic | ICD-10-CM

## 2025-06-05 DIAGNOSIS — F41.1 GENERALIZED ANXIETY DISORDER: ICD-10-CM

## 2025-06-05 RX ORDER — DIAZEPAM 10 MG/1
10 TABLET ORAL 3 TIMES DAILY PRN
Qty: 90 TABLET | Refills: 2 | Status: SHIPPED | OUTPATIENT
Start: 2025-06-05

## 2025-06-05 RX ORDER — HYDROCODONE BITARTRATE AND ACETAMINOPHEN 5; 325 MG/1; MG/1
1.5 TABLET ORAL EVERY 8 HOURS PRN
Qty: 135 TABLET | Refills: 0 | Status: SHIPPED | OUTPATIENT
Start: 2025-06-05

## 2025-06-25 ENCOUNTER — OFFICE VISIT (OUTPATIENT)
Dept: BEHAVIORAL HEALTH | Facility: CLINIC | Age: 65
End: 2025-06-25
Payer: OTHER GOVERNMENT

## 2025-06-25 VITALS — HEIGHT: 61 IN | BODY MASS INDEX: 39.27 KG/M2 | WEIGHT: 208 LBS

## 2025-06-25 DIAGNOSIS — F31.75 BIPOLAR 1 DISORDER, DEPRESSED, PARTIAL REMISSION: ICD-10-CM

## 2025-06-25 DIAGNOSIS — G47.00 INSOMNIA, UNSPECIFIED TYPE: ICD-10-CM

## 2025-06-25 RX ORDER — ZIPRASIDONE HYDROCHLORIDE 60 MG/1
60 CAPSULE ORAL 2 TIMES DAILY WITH MEALS
Qty: 60 CAPSULE | Refills: 2 | Status: SHIPPED | OUTPATIENT
Start: 2025-06-25

## 2025-06-25 NOTE — PROGRESS NOTES
Office  Follow Up Visit      Patient Name: Sherri Elkins  : 1960   MRN: [id number]     Referring Provider: Malik Reddy MD    Chief Complaint: Medication follow-up    ICD-10-CM ICD-9-CM   1. Insomnia, unspecified type  G47.00 780.52   2. Bipolar 1 disorder, depressed, partial remission  F31.75 296.55        History of Present Illness:   Sherri Elkins is a 65 y.o. female who is here today for follow up related to medication management of bipolar depression and insomnia.  Medication regimen includes ziprasidone 60 mg twice daily, and amitriptyline 25 mg nightly.    Patient reports things have been about the same since her last follow-up.  She reports that she has some good days where she wakes up feeling motivated has lots of energy.  However, she reports that there are some days when she feels like she does will get out of bed and does not have the motivation.  She reports some continued situational relational stress related to her younger daughter.  She reports her younger daughter has a history of polysubstance abuse and has been living on the streets on and off for the past several years.  She reports that she is currently back home with her which has been difficult.  She reports that her daughter will eventually leave when she feels better and go back to using drugs.  She reports that her  does not want her at home as well as their granddaughter, who they have raised.  She reports that when her granddaughters mothers around it is very bothersome to her granddaughter.  Patient reports that she just feels that she is trying to not lose her daughter and realizes that her behaviors are not helping.  She reports that she will always let her back and up to this point but her daughter will constantly go back to using drugs.  She affirms that she is struggling setting appropriate boundaries.  Reports no adverse effects with her medication regimen.      Subjective      Review of Systems:    Review of Systems   Constitutional: Negative.    Respiratory: Negative.     Cardiovascular: Negative.    Gastrointestinal: Negative.    Genitourinary: Negative.    Musculoskeletal: Negative.    Neurological: Negative.    Psychiatric/Behavioral:  Positive for dysphoric mood.        Patient History:   The following portions of the patient's history were reviewed and updated as appropriate: allergies, current medications, past family history, past medical history, past social history, past surgical history and problem list.     Social:  No change in interval history.    Medications:     Current Outpatient Medications:     amitriptyline (ELAVIL) 25 MG tablet, Take 1 tablet by mouth Every Night., Disp: 90 tablet, Rfl: 1    ziprasidone (GEODON) 60 MG capsule, Take 1 capsule by mouth 2 (Two) Times a Day With Meals., Disp: 60 capsule, Rfl: 2    bisoprolol-hydrochlorothiazide (ZIAC) 10-6.25 MG per tablet, Take 1 tablet by mouth Daily., Disp: 90 tablet, Rfl: 1    cyclobenzaprine (FLEXERIL) 5 MG tablet, Take 1 tablet by mouth 3 (Three) Times a Day As Needed for Muscle Spasms., Disp: 270 tablet, Rfl: 1    diazePAM (VALIUM) 10 MG tablet, TAKE 1 TABLET BY MOUTH THREE TIMES DAILY AS NEEDED FOR ANXIETY, Disp: 90 tablet, Rfl: 2    famotidine (PEPCID) 20 MG tablet, Take 1 tablet by mouth 2 (Two) Times a Day., Disp: 180 tablet, Rfl: 1    furosemide (LASIX) 40 MG tablet, Take 1 tablet by mouth 2 (Two) Times a Day., Disp: 60 tablet, Rfl: 5    hydroCHLOROthiazide (MICROZIDE) 12.5 MG capsule, Take 1 capsule by mouth Every Morning., Disp: 90 capsule, Rfl: 1    HYDROcodone-acetaminophen (Norco) 5-325 MG per tablet, Take 1.5 tablets by mouth Every 8 (Eight) Hours As Needed for Severe Pain., Disp: 135 tablet, Rfl: 0    Jardiance 10 MG tablet tablet, TAKE 1 TABLET DAILY, Disp: 90 tablet, Rfl: 3    lovastatin (MEVACOR) 10 MG tablet, TAKE 1 TABLET BY MOUTH EVERY NIGHT, Disp: 90 tablet, Rfl: 1    omeprazole (priLOSEC) 40 MG capsule, Take 1  "capsule by mouth Daily., Disp: 30 capsule, Rfl: 4    potassium chloride (KLOR-CON M20) 20 MEQ CR tablet, Take 1 tablet by mouth Daily., Disp: 90 tablet, Rfl: 1    Objective     Physical Exam:  Vital Signs:   Vitals:    06/25/25 0931   Weight: 94.3 kg (208 lb)   Height: 154.9 cm (61\")     Body mass index is 39.3 kg/m².     Mental Status Exam:   Hygiene:   good  Cooperation:  Cooperative  Eye Contact:  Good  Psychomotor Behavior:  Appropriate  Affect:  Restricted  Mood: sad  Speech:  Minimal  Thought Process:  Circum  Thought Content:  Mood congruent  Suicidal:  None  Homicidal:  None  Hallucinations:  None  Delusion:  None  Memory:  Intact  Orientation:  Grossly intact  Reliability:  good  Insight:  Fair  Judgement:  Poor  Impulse Control:  Fair  Physical/Medical Issues:  Yes see chart     Assessment / Plan      Visit Diagnosis/Orders Placed This Visit:  Diagnoses and all orders for this visit:    1. Insomnia, unspecified type  -     amitriptyline (ELAVIL) 25 MG tablet; Take 1 tablet by mouth Every Night.  Dispense: 90 tablet; Refill: 1    2. Bipolar 1 disorder, depressed, partial remission  -     ziprasidone (GEODON) 60 MG capsule; Take 1 capsule by mouth 2 (Two) Times a Day With Meals.  Dispense: 60 capsule; Refill: 2         Differential:   Stress response    Plan:   Continue current medication regimen.  Discussed setting appropriate boundaries in relation to her daughter.  Discussed grief process and processing of suppressed emotions.    Continue supportive psychotherapy efforts and medications as indicated. Treatment and medication options discussed during today's visit. Patient ackowledged and verbally consented to continue with current treatment plan and was educated on the importance of compliance with treatment and follow-up appointments. Patient seems reasonably able to adhere to treatment plan.      Medication Considerations:  Discussed medication options and treatment plan of prescribed medication(s) as " well as the risks, benefits, and potential side effects. Patient is agreeable to call the office with any worsening of symptoms or onset of side effects. Patient is agreeable to call 911 or go to the nearest ER should he/she begin having SI/HI.    Quality Measures:   Light Smoker less than 3 minutes spent counseling Will try to cut down    I advised Sherri Elkins of the risks of tobacco use.     Follow Up:   Return in about 3 months (around 9/25/2025) for Recheck.      JONAH Hernández, PMHNP-BC      *Part of this note may be an electronic transcription/translation of spoken language to printed text using the Dragon Dictation System.     Answers submitted by the patient for this visit:  Anxiety (Submitted on 6/18/2025)  Chief Complaint: Anxiety  Visit: follow-up  Frequency: constantly  Severity: moderate  depressed mood: Yes  dry mouth: No  excessive worry: Yes  insomnia: No  irritability: Yes  malaise/fatigue: Yes  obsessions: No  Sleep quality: fair  Hours of sleep per night: 6 Hours  Medication compliance: %  Side effects: confusion, fatigue

## 2025-07-01 DIAGNOSIS — M47.816 ARTHRITIS OF LUMBAR SPINE: Chronic | ICD-10-CM

## 2025-07-01 DIAGNOSIS — M48.02 FORAMINAL STENOSIS OF CERVICAL REGION: ICD-10-CM

## 2025-07-01 DIAGNOSIS — G89.4 CHRONIC PAIN SYNDROME: ICD-10-CM

## 2025-07-01 RX ORDER — HYDROCODONE BITARTRATE AND ACETAMINOPHEN 5; 325 MG/1; MG/1
1.5 TABLET ORAL EVERY 8 HOURS PRN
Qty: 135 TABLET | Refills: 0 | Status: SHIPPED | OUTPATIENT
Start: 2025-07-01

## 2025-08-01 DIAGNOSIS — G89.4 CHRONIC PAIN SYNDROME: ICD-10-CM

## 2025-08-01 DIAGNOSIS — M48.02 FORAMINAL STENOSIS OF CERVICAL REGION: ICD-10-CM

## 2025-08-01 DIAGNOSIS — M47.816 ARTHRITIS OF LUMBAR SPINE: Chronic | ICD-10-CM

## 2025-08-04 DIAGNOSIS — K21.9 GASTROESOPHAGEAL REFLUX DISEASE WITHOUT ESOPHAGITIS: ICD-10-CM

## 2025-08-04 RX ORDER — HYDROCODONE BITARTRATE AND ACETAMINOPHEN 5; 325 MG/1; MG/1
1.5 TABLET ORAL EVERY 8 HOURS PRN
Qty: 135 TABLET | Refills: 0 | Status: SHIPPED | OUTPATIENT
Start: 2025-08-04

## 2025-08-04 RX ORDER — OMEPRAZOLE 40 MG/1
40 CAPSULE, DELAYED RELEASE ORAL DAILY
Qty: 30 CAPSULE | Refills: 4 | Status: SHIPPED | OUTPATIENT
Start: 2025-08-04